# Patient Record
Sex: MALE | Race: BLACK OR AFRICAN AMERICAN | Employment: PART TIME | URBAN - METROPOLITAN AREA
[De-identification: names, ages, dates, MRNs, and addresses within clinical notes are randomized per-mention and may not be internally consistent; named-entity substitution may affect disease eponyms.]

---

## 2017-07-10 LAB
AVERAGE GLUCOSE: NORMAL
HBA1C MFR BLD: 6.1 %

## 2017-07-11 ENCOUNTER — OFFICE VISIT (OUTPATIENT)
Dept: PRIMARY CARE CLINIC | Age: 52
End: 2017-07-11

## 2017-07-11 VITALS
TEMPERATURE: 97.7 F | SYSTOLIC BLOOD PRESSURE: 120 MMHG | RESPIRATION RATE: 16 BRPM | WEIGHT: 224 LBS | HEART RATE: 69 BPM | OXYGEN SATURATION: 96 % | BODY MASS INDEX: 33.18 KG/M2 | DIASTOLIC BLOOD PRESSURE: 70 MMHG | HEIGHT: 69 IN

## 2017-07-11 DIAGNOSIS — R73.9 HYPERGLYCEMIA: ICD-10-CM

## 2017-07-11 DIAGNOSIS — N28.9 RENAL INSUFFICIENCY: ICD-10-CM

## 2017-07-11 DIAGNOSIS — Z01.818 PREOP EXAMINATION: Primary | ICD-10-CM

## 2017-07-11 PROCEDURE — 99214 OFFICE O/P EST MOD 30 MIN: CPT | Performed by: INTERNAL MEDICINE

## 2017-07-11 RX ORDER — CALCITRIOL 0.5 UG/1
0.5 CAPSULE, LIQUID FILLED ORAL DAILY
COMMUNITY
Start: 2017-05-16

## 2017-07-11 RX ORDER — MYCOPHENOLATE MOFETIL 250 MG/1
250 CAPSULE ORAL 2 TIMES DAILY
COMMUNITY
Start: 2017-06-23

## 2017-07-11 RX ORDER — FENOFIBRATE 200 MG/1
200 CAPSULE ORAL
COMMUNITY
End: 2017-07-11

## 2017-07-11 ASSESSMENT — ENCOUNTER SYMPTOMS
WHEEZING: 0
EYES NEGATIVE: 1
CHEST TIGHTNESS: 0

## 2017-07-11 ASSESSMENT — PATIENT HEALTH QUESTIONNAIRE - PHQ9
SUM OF ALL RESPONSES TO PHQ9 QUESTIONS 1 & 2: 0
SUM OF ALL RESPONSES TO PHQ QUESTIONS 1-9: 0
1. LITTLE INTEREST OR PLEASURE IN DOING THINGS: 0
2. FEELING DOWN, DEPRESSED OR HOPELESS: 0

## 2017-09-20 ENCOUNTER — OFFICE VISIT (OUTPATIENT)
Dept: PRIMARY CARE CLINIC | Age: 52
End: 2017-09-20

## 2017-09-20 VITALS
HEART RATE: 74 BPM | DIASTOLIC BLOOD PRESSURE: 70 MMHG | RESPIRATION RATE: 18 BRPM | BODY MASS INDEX: 33.5 KG/M2 | TEMPERATURE: 97 F | WEIGHT: 234 LBS | OXYGEN SATURATION: 100 % | SYSTOLIC BLOOD PRESSURE: 120 MMHG | HEIGHT: 70 IN

## 2017-09-20 DIAGNOSIS — I10 ESSENTIAL HYPERTENSION: ICD-10-CM

## 2017-09-20 DIAGNOSIS — E11.9 TYPE 2 DIABETES, DIET CONTROLLED (HCC): Primary | ICD-10-CM

## 2017-09-20 PROCEDURE — 99213 OFFICE O/P EST LOW 20 MIN: CPT | Performed by: INTERNAL MEDICINE

## 2017-09-20 RX ORDER — CLONIDINE HYDROCHLORIDE 0.2 MG/1
0.2 TABLET ORAL 2 TIMES DAILY
Qty: 60 TABLET | Refills: 6 | Status: SHIPPED | OUTPATIENT
Start: 2017-09-20 | End: 2019-06-06 | Stop reason: SDUPTHER

## 2017-09-20 RX ORDER — NIFEDIPINE 60 MG/1
60 TABLET, EXTENDED RELEASE ORAL 2 TIMES DAILY
Qty: 60 TABLET | Refills: 6 | Status: SHIPPED | OUTPATIENT
Start: 2017-09-20 | End: 2019-06-06 | Stop reason: SDUPTHER

## 2017-09-20 ASSESSMENT — ENCOUNTER SYMPTOMS
EYES NEGATIVE: 1
WHEEZING: 0
CHEST TIGHTNESS: 0

## 2018-01-23 LAB
AVERAGE GLUCOSE: NORMAL
HBA1C MFR BLD: 6.7 %

## 2018-06-08 ENCOUNTER — OFFICE VISIT (OUTPATIENT)
Dept: PRIMARY CARE CLINIC | Age: 53
End: 2018-06-08

## 2018-06-08 VITALS
HEART RATE: 61 BPM | SYSTOLIC BLOOD PRESSURE: 133 MMHG | WEIGHT: 228 LBS | TEMPERATURE: 97 F | RESPIRATION RATE: 16 BRPM | DIASTOLIC BLOOD PRESSURE: 78 MMHG | OXYGEN SATURATION: 98 % | HEIGHT: 70 IN | BODY MASS INDEX: 32.64 KG/M2

## 2018-06-08 DIAGNOSIS — M10.9 ACUTE GOUT OF RIGHT ANKLE, UNSPECIFIED CAUSE: Primary | ICD-10-CM

## 2018-06-08 PROCEDURE — 99213 OFFICE O/P EST LOW 20 MIN: CPT | Performed by: INTERNAL MEDICINE

## 2018-06-08 RX ORDER — COLCHICINE 0.6 MG/1
0.6 TABLET ORAL 2 TIMES DAILY
Qty: 60 TABLET | Refills: 3 | Status: SHIPPED | OUTPATIENT
Start: 2018-06-08 | End: 2019-01-23 | Stop reason: CLARIF

## 2018-06-08 RX ORDER — FEBUXOSTAT 80 MG/1
80 TABLET, FILM COATED ORAL DAILY
Qty: 30 TABLET | Refills: 3 | Status: SHIPPED | OUTPATIENT
Start: 2018-06-08

## 2018-06-08 ASSESSMENT — ENCOUNTER SYMPTOMS
EYE REDNESS: 0
RESPIRATORY NEGATIVE: 1
EYE DISCHARGE: 0
EYE ITCHING: 0
GASTROINTESTINAL NEGATIVE: 1
EYE PAIN: 0
PHOTOPHOBIA: 0

## 2018-06-13 ENCOUNTER — TELEPHONE (OUTPATIENT)
Dept: PRIMARY CARE CLINIC | Age: 53
End: 2018-06-13

## 2019-01-23 ENCOUNTER — TELEPHONE (OUTPATIENT)
Dept: PRIMARY CARE CLINIC | Age: 54
End: 2019-01-23

## 2019-01-23 ENCOUNTER — OFFICE VISIT (OUTPATIENT)
Dept: PRIMARY CARE CLINIC | Age: 54
End: 2019-01-23
Payer: COMMERCIAL

## 2019-01-23 VITALS
SYSTOLIC BLOOD PRESSURE: 100 MMHG | HEIGHT: 70 IN | BODY MASS INDEX: 33.36 KG/M2 | WEIGHT: 233 LBS | TEMPERATURE: 97.7 F | OXYGEN SATURATION: 97 % | HEART RATE: 60 BPM | DIASTOLIC BLOOD PRESSURE: 70 MMHG

## 2019-01-23 DIAGNOSIS — I10 ESSENTIAL HYPERTENSION: ICD-10-CM

## 2019-01-23 DIAGNOSIS — B35.4 TINEA CORPORIS: Primary | ICD-10-CM

## 2019-01-23 DIAGNOSIS — E11.9 TYPE 2 DIABETES, DIET CONTROLLED (HCC): ICD-10-CM

## 2019-01-23 PROCEDURE — 99213 OFFICE O/P EST LOW 20 MIN: CPT | Performed by: INTERNAL MEDICINE

## 2019-01-23 RX ORDER — DOCUSATE SODIUM 100 MG/1
100 CAPSULE, LIQUID FILLED ORAL 2 TIMES DAILY
COMMUNITY

## 2019-01-23 RX ORDER — SODIUM BICARBONATE 650 MG/1
650 TABLET ORAL 4 TIMES DAILY
COMMUNITY

## 2019-01-23 RX ORDER — TAMSULOSIN HYDROCHLORIDE 0.4 MG/1
0.4 CAPSULE ORAL DAILY
COMMUNITY
End: 2019-07-10 | Stop reason: SDUPTHER

## 2019-01-23 RX ORDER — CLOTRIMAZOLE AND BETAMETHASONE DIPROPIONATE 10; .64 MG/G; MG/G
CREAM TOPICAL
Qty: 4 G | Refills: 5 | Status: SHIPPED | OUTPATIENT
Start: 2019-01-23

## 2019-01-23 RX ORDER — CLOTRIMAZOLE AND BETAMETHASONE DIPROPIONATE 10; .64 MG/G; MG/G
CREAM TOPICAL
Qty: 2 G | Refills: 5 | Status: SHIPPED | OUTPATIENT
Start: 2019-01-23 | End: 2019-01-23 | Stop reason: CLARIF

## 2019-01-23 ASSESSMENT — PATIENT HEALTH QUESTIONNAIRE - PHQ9
SUM OF ALL RESPONSES TO PHQ QUESTIONS 1-9: 0
1. LITTLE INTEREST OR PLEASURE IN DOING THINGS: 0
SUM OF ALL RESPONSES TO PHQ QUESTIONS 1-9: 0
2. FEELING DOWN, DEPRESSED OR HOPELESS: 0
SUM OF ALL RESPONSES TO PHQ9 QUESTIONS 1 & 2: 0

## 2019-01-23 ASSESSMENT — ENCOUNTER SYMPTOMS
EYES NEGATIVE: 1
WHEEZING: 0
CHEST TIGHTNESS: 0

## 2019-05-24 ENCOUNTER — INITIAL CONSULT (OUTPATIENT)
Dept: SURGERY | Age: 54
End: 2019-05-24
Payer: COMMERCIAL

## 2019-05-24 VITALS
HEART RATE: 84 BPM | SYSTOLIC BLOOD PRESSURE: 121 MMHG | DIASTOLIC BLOOD PRESSURE: 70 MMHG | HEIGHT: 70 IN | WEIGHT: 225 LBS | BODY MASS INDEX: 32.21 KG/M2

## 2019-05-24 DIAGNOSIS — M79.604 PAIN IN BOTH LOWER EXTREMITIES: ICD-10-CM

## 2019-05-24 DIAGNOSIS — I70.219 TYPE 2 DIABETES MELLITUS WITH ATHEROSCLEROSIS OF NATIVE ARTERIES OF EXTREMITY WITH INTERMITTENT CLAUDICATION (HCC): ICD-10-CM

## 2019-05-24 DIAGNOSIS — I73.9 PAD (PERIPHERAL ARTERY DISEASE) (HCC): Primary | ICD-10-CM

## 2019-05-24 DIAGNOSIS — E11.51 TYPE 2 DIABETES MELLITUS WITH ATHEROSCLEROSIS OF NATIVE ARTERIES OF EXTREMITY WITH INTERMITTENT CLAUDICATION (HCC): ICD-10-CM

## 2019-05-24 DIAGNOSIS — M79.605 PAIN IN BOTH LOWER EXTREMITIES: ICD-10-CM

## 2019-05-24 PROCEDURE — 99213 OFFICE O/P EST LOW 20 MIN: CPT | Performed by: SURGERY

## 2019-05-24 ASSESSMENT — ENCOUNTER SYMPTOMS
RESPIRATORY NEGATIVE: 1
GASTROINTESTINAL NEGATIVE: 1
COLOR CHANGE: 0
ALLERGIC/IMMUNOLOGIC NEGATIVE: 1

## 2019-05-24 NOTE — LETTER
[facility]  [address] [place] [address]  Phone: [phone]  Fax: [phone]      May 24, 2019       Patient: Licha Bowen. MR Number: [id number]   YOB: 1965   Date of Visit: 5/24/2019       Dear Dr. Esthela Bello:    Thank you for the request for consultation for Pratibha Velasco to me. Below are the relevant portions of my assessment and plan of care. Assessment:       Diagnosis   1. PAD (peripheral artery disease) (HCC)    2. Pain in both lower extremities    3. Type 2 diabetes mellitus with atherosclerosis of native arteries of extremity with intermittent claudication (HCC)            Plan:       Since patient has long hx of DM2 with previous kidney transplant, and slightly low GILBERTO patient will return to office in 2wks for Arterial duplex of IVC, Iliacs, and B/L LE, then follow up every 6mo. Patient is to continue his daily 81MG Aspirin. Patient has been instructed to follow up in 1 month for a AAA scan and office visit. The patient will need to fast for at least 5 hours prior to the ultrasound scan. Please understand that by not doing so may result in having an inaccurate ultrasound and may cause your ultrasound to be rescheduled. Patient has been instructed to follow up in 1 month for an arterial duplex scan with gilberto's and office visit. Patient is encouraged to walk as much as tolerated. I Chastity Olea MA am scribing for and in the presence of Milo Bentley MD on this date of 05/24/19 at 2:49 PM    I Milo Bentley MD personally performed the services described in this documentation as scribed by the Certified Medical Assistant Chastity Olea in my presence and it is both accurate and complete. If you have questions, please do not hesitate to call me. I look forward to following [facility] along with you.     Sincerely,        Milo Bentley MD    CC providers:  No Recipients

## 2019-05-24 NOTE — PROGRESS NOTES
Subjective:      Patient ID: Steven Pelaez is a 47 y.o. male. Chief Complaint   Patient presents with    New Patient     New patient referred by Dr Kevin Hamilton for Abnormal PAD test.       Leg Pain    The incident occurred more than 1 week ago. The incident occurred at home. The injury mechanism is unknown. The pain is present in the left leg and right leg. The quality of the pain is described as cramping and aching. The pain is at a severity of 4/10. The pain is moderate. The pain has been intermittent since onset. Pertinent negatives include no loss of sensation, muscle weakness or numbness. It is unknown if a foreign body is present. The symptoms are aggravated by weight bearing. He has tried rest and elevation for the symptoms. The treatment provided mild relief. Family History   Problem Relation Age of Onset    Diabetes Mother     Diabetes Father     Diabetes Sister     Diabetes Brother        Past Medical History:   Diagnosis Date    Hypertension     Kidney disease     Type II or unspecified type diabetes mellitus without mention of complication, not stated as uncontrolled      History reviewed. No pertinent surgical history.     Social History     Socioeconomic History    Marital status: Single     Spouse name: Not on file    Number of children: Not on file    Years of education: Not on file    Highest education level: Not on file   Occupational History    Not on file   Social Needs    Financial resource strain: Not on file    Food insecurity:     Worry: Not on file     Inability: Not on file    Transportation needs:     Medical: Not on file     Non-medical: Not on file   Tobacco Use    Smoking status: Never Smoker    Smokeless tobacco: Never Used   Substance and Sexual Activity    Alcohol use: No    Drug use: No    Sexual activity: Yes     Partners: Female     Birth control/protection: Condom   Lifestyle    Physical activity:     Days per week: Not on file     Minutes per session: Not on file    Stress: Not on file   Relationships    Social connections:     Talks on phone: Not on file     Gets together: Not on file     Attends [employer] service: Not on file     Active member of club or organization: Not on file     Attends meetings of clubs or organizations: Not on file     Relationship status: Not on file    Intimate partner violence:     Fear of current or ex partner: Not on file     Emotionally abused: Not on file     Physically abused: Not on file     Forced sexual activity: Not on file   Other Topics Concern    Not on file   Social History Narrative    Not on file         Review of Systems   Constitutional: Negative. Negative for activity change and appetite change. HENT: Negative. Eyes: Positive for visual disturbance (wears glasses ). Respiratory: Negative. Cardiovascular: Negative. Gastrointestinal: Negative. Endocrine: Negative. Genitourinary: Negative. Musculoskeletal: Negative. Negative for gait problem, joint swelling and myalgias. Skin: Negative for color change, pallor and wound. Allergic/Immunologic: Negative. Neurological: Negative. Negative for numbness. Hematological: Negative. Psychiatric/Behavioral: Negative. Vitals:    05/24/19 1423   BP: 121/70   Pulse: 84       Objective:   Physical Exam   Constitutional: He is oriented to person, place, and time. He appears well-developed and well-nourished. No distress. HENT:   Head: Normocephalic and atraumatic. Nose: Nose normal.   Eyes: Pupils are equal, round, and reactive to light. Conjunctivae and EOM are normal. Right eye exhibits no discharge. Left eye exhibits no discharge. No scleral icterus. Neck: Normal range of motion. No thyromegaly present. Cardiovascular: Normal rate, regular rhythm, normal heart sounds and intact distal pulses. DP and PT 1/4 and present B/L LE. Pulmonary/Chest: Effort normal and breath sounds normal.   Abdominal: Soft.    Musculoskeletal: Normal range of motion. He exhibits no edema, tenderness or deformity. Neurological: He is alert and oriented to person, place, and time. Skin: Skin is dry. Capillary refill takes less than 2 seconds. No rash noted. He is not diaphoretic. No erythema. No pallor. Psychiatric: He has a normal mood and affect. His behavior is normal. Judgment and thought content normal.   Nursing note and vitals reviewed. No Known Allergies    Assessment:       Diagnosis Orders   1. PAD (peripheral artery disease) (HCC)  VL DUP LOWER EXTREMITY ARTERIES BILATERAL    VL AORTA/IVC/ILIAC/BYPASS GRAFT COMPLETE   2. Pain in both lower extremities  VL DUP LOWER EXTREMITY ARTERIES BILATERAL    VL AORTA/IVC/ILIAC/BYPASS GRAFT COMPLETE   3. 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 Patient is to continue his daily 81MG Aspirin. Patient has been instructed to follow up in 1 month for a AAA scan and office visit. The patient will need to fast for at least 5 hours prior to the ultrasound scan. Please understand that by not doing so may result in having an inaccurate ultrasound and may cause your ultrasound to be rescheduled. Patient has been instructed to follow up in 1 month for an arterial duplex scan with gilberto's and office visit. Patient is encouraged to walk as much as tolerated.     I Abbi Chávez MA am scribing for and in the presence of Vasiliy Mckeon MD on this date of 05/24/19 at 2:49 PM    I Vasiliy Mckeon MD personally performed the services described in this documentation as scribed by the Certified [facility] Fernandez in my presence and it is both accurate and complete.     Federico Pritchard DPM   Podiatric Resident, PGY-2  Pager: [phone]  5/24/2019, 2:49 PM

## 2019-06-06 ENCOUNTER — OFFICE VISIT (OUTPATIENT)
Dept: PRIMARY CARE CLINIC | Age: 54
End: 2019-06-06
Payer: COMMERCIAL

## 2019-06-06 VITALS
BODY MASS INDEX: 33.79 KG/M2 | WEIGHT: 236 LBS | DIASTOLIC BLOOD PRESSURE: 75 MMHG | HEART RATE: 68 BPM | HEIGHT: 70 IN | SYSTOLIC BLOOD PRESSURE: 135 MMHG

## 2019-06-06 DIAGNOSIS — D64.9 ANEMIA, UNSPECIFIED TYPE: ICD-10-CM

## 2019-06-06 DIAGNOSIS — I10 ESSENTIAL HYPERTENSION: ICD-10-CM

## 2019-06-06 DIAGNOSIS — E11.9 TYPE 2 DIABETES MELLITUS WITHOUT COMPLICATION, WITHOUT LONG-TERM CURRENT USE OF INSULIN (HCC): ICD-10-CM

## 2019-06-06 PROCEDURE — 99214 OFFICE O/P EST MOD 30 MIN: CPT | Performed by: INTERNAL MEDICINE

## 2019-06-06 RX ORDER — CLONIDINE HYDROCHLORIDE 0.2 MG/1
0.2 TABLET ORAL 2 TIMES DAILY
Qty: 60 TABLET | Refills: 6 | Status: SHIPPED | OUTPATIENT
Start: 2019-06-06 | End: 2019-10-01 | Stop reason: SDUPTHER

## 2019-06-06 RX ORDER — GLIMEPIRIDE 1 MG/1
1 TABLET ORAL EVERY MORNING
Qty: 30 TABLET | Refills: 5 | Status: SHIPPED | OUTPATIENT
Start: 2019-06-06 | End: 2019-07-10 | Stop reason: SDUPTHER

## 2019-06-06 RX ORDER — NIFEDIPINE 60 MG/1
60 TABLET, EXTENDED RELEASE ORAL 2 TIMES DAILY
Qty: 60 TABLET | Refills: 6 | Status: SHIPPED | OUTPATIENT
Start: 2019-06-06 | End: 2019-10-01 | Stop reason: SDUPTHER

## 2019-06-06 ASSESSMENT — ENCOUNTER SYMPTOMS
GASTROINTESTINAL NEGATIVE: 1
EYES NEGATIVE: 1
SHORTNESS OF BREATH: 0
WHEEZING: 0
CHEST TIGHTNESS: 0
COUGH: 0

## 2019-06-06 NOTE — PROGRESS NOTES
Subjective:      Patient ID: Danielle Kelly is a 47 y.o. male. 6/6/19 Patient presents with: Follow-up: discuss labs            Last seen  1/23/19 Patient presents with:  Rash: c/o chafing raw skin under stomach for 1 week. Last seen  9/20/`17 Patient presents with:  Blood Sugar Problem  Hypertension              Last seen  7/11/17 Patient presents with:  Pre-op Exam: Surgery scheduled 7/17/17 Dr Sabine Cheung Cystoscopy  H/O repeat UTIs           Last seen 7/26/16     S/p kidney transplant 2014 ; transplant not going too well   No personal or FH of anaesthesia problems or bleeding disorders        Rash   Pertinent negatives include no cough, fatigue, fever or shortness of breath. Review of Systems   Constitutional: Negative for chills, fatigue and fever. Td < 10 yrs . Hep B vaccination complete    Flu vac 2013; [facility]    HENT: Negative. Dental exam 8/12   Eyes: Negative. Eye Ex  11/18   Respiratory: Negative for cough, chest tightness, shortness of breath and wheezing. Does not smoke . Etoh occ   Cardiovascular: Negative. No CAD ; EST at [facility] 12/13 . Nl    Gastrointestinal: Negative. No FH of ca colon . colonoscopy 12/13 ; Dr Nani Hankins    Genitourinary: Negative for dysuria, frequency and urgency. S/P Renal Transplant  12/14 . Waiting for new Kidney             Musculoskeletal: Negative. Skin: Negative for rash. Neurological: Negative for dizziness, tremors, weakness, light-headedness and headaches. Psychiatric/Behavioral: Negative for behavioral problems and sleep disturbance. The patient is not nervous/anxious. Objective:   Physical Exam   Constitutional: He is oriented to person, place, and time. He appears well-nourished. Eyes: Conjunctivae are normal.   Neck: Neck supple. Cardiovascular: Normal rate, regular rhythm and normal heart sounds. Exam reveals no friction rub.    Pulmonary/Chest: Breath sounds normal. Abdominal: He exhibits distension. Genitourinary: Rectum normal.   Musculoskeletal: Normal range of motion. He exhibits no edema. Neurological: He is alert and oriented to person, place, and time. He has normal strength. Skin: Skin is warm. Psychiatric: He has a normal mood and affect. His behavior is normal.   Vitals reviewed. Assessment:      Kimberly Barger was seen today for follow-up. Diagnoses and all orders for this visit:    Essential hypertension Controlled   -     NIFEdipine (NIFEDICAL XL) 60 MG extended release tablet; Take 1 tablet by mouth 2 times daily  -     cloNIDine (CATAPRES) 0.2 MG tablet; Take 1 tablet by mouth 2 times daily    Type 2 diabetes mellitus without complication, without long-term current use of insulin ([facility]) A1C 7.9 . Was on Diet control nly . Start Amaryl 1 mg / d   -     glimepiride (AMARYL) 1 MG tablet; Take 1 tablet by mouth every morning    Anemia, unspecified type  Renal . Needs Colonoscopy too   -     DOMINGUEZ - Mari Mosher MD, Gastroenterology, [facility]        Renal insufficiency   Progressive . on Cellcept .  Being evaluated for another transplant C/O Dr Lizette Gardner

## 2019-07-05 ENCOUNTER — PROCEDURE VISIT (OUTPATIENT)
Dept: SURGERY | Age: 54
End: 2019-07-05
Payer: COMMERCIAL

## 2019-07-05 DIAGNOSIS — I70.219 TYPE 2 DIABETES MELLITUS WITH ATHEROSCLEROSIS OF NATIVE ARTERIES OF EXTREMITY WITH INTERMITTENT CLAUDICATION (HCC): ICD-10-CM

## 2019-07-05 DIAGNOSIS — E11.51 TYPE 2 DIABETES MELLITUS WITH ATHEROSCLEROSIS OF NATIVE ARTERIES OF EXTREMITY WITH INTERMITTENT CLAUDICATION (HCC): ICD-10-CM

## 2019-07-05 DIAGNOSIS — M79.604 PAIN IN BOTH LOWER EXTREMITIES: ICD-10-CM

## 2019-07-05 DIAGNOSIS — I73.9 PAD (PERIPHERAL ARTERY DISEASE) (HCC): ICD-10-CM

## 2019-07-05 DIAGNOSIS — M79.605 PAIN IN BOTH LOWER EXTREMITIES: ICD-10-CM

## 2019-07-05 PROCEDURE — 93978 VASCULAR STUDY: CPT | Performed by: SURGERY

## 2019-07-05 PROCEDURE — 93925 LOWER EXTREMITY STUDY: CPT | Performed by: SURGERY

## 2019-07-09 ENCOUNTER — PATIENT MESSAGE (OUTPATIENT)
Dept: PRIMARY CARE CLINIC | Age: 54
End: 2019-07-09

## 2019-07-09 DIAGNOSIS — E11.9 TYPE 2 DIABETES MELLITUS WITHOUT COMPLICATION, WITHOUT LONG-TERM CURRENT USE OF INSULIN (HCC): ICD-10-CM

## 2019-07-10 RX ORDER — TAMSULOSIN HYDROCHLORIDE 0.4 MG/1
0.4 CAPSULE ORAL DAILY
Qty: 90 CAPSULE | Refills: 1 | Status: SHIPPED | OUTPATIENT
Start: 2019-07-10

## 2019-07-10 RX ORDER — GLIMEPIRIDE 1 MG/1
1 TABLET ORAL EVERY MORNING
Qty: 90 TABLET | Refills: 1 | Status: SHIPPED | OUTPATIENT
Start: 2019-07-10 | End: 2020-06-09 | Stop reason: SDUPTHER

## 2019-07-10 NOTE — TELEPHONE ENCOUNTER
From: Cuca Marvin. To: Amy Machuca MD  Sent: 7/9/2019 4:51 PM EDT  Subject: Prescription Question    Can you send a refill to [facility] for Glimepiride an Tamsulosin only.

## 2019-10-01 ENCOUNTER — OFFICE VISIT (OUTPATIENT)
Dept: PRIMARY CARE CLINIC | Age: 54
End: 2019-10-01
Payer: COMMERCIAL

## 2019-10-01 VITALS
SYSTOLIC BLOOD PRESSURE: 120 MMHG | BODY MASS INDEX: 33.21 KG/M2 | HEART RATE: 75 BPM | WEIGHT: 232 LBS | HEIGHT: 70 IN | DIASTOLIC BLOOD PRESSURE: 60 MMHG

## 2019-10-01 DIAGNOSIS — I10 ESSENTIAL HYPERTENSION: ICD-10-CM

## 2019-10-01 DIAGNOSIS — H44.001 INFECTION OF RIGHT EYE: ICD-10-CM

## 2019-10-01 DIAGNOSIS — E11.9 TYPE 2 DIABETES MELLITUS WITHOUT COMPLICATION, WITHOUT LONG-TERM CURRENT USE OF INSULIN (HCC): ICD-10-CM

## 2019-10-01 DIAGNOSIS — Z23 NEEDS FLU SHOT: ICD-10-CM

## 2019-10-01 LAB — HBA1C MFR BLD: 7.3 %

## 2019-10-01 PROCEDURE — 90471 IMMUNIZATION ADMIN: CPT | Performed by: INTERNAL MEDICINE

## 2019-10-01 PROCEDURE — 90686 IIV4 VACC NO PRSV 0.5 ML IM: CPT | Performed by: INTERNAL MEDICINE

## 2019-10-01 PROCEDURE — 83036 HEMOGLOBIN GLYCOSYLATED A1C: CPT | Performed by: INTERNAL MEDICINE

## 2019-10-01 PROCEDURE — 99214 OFFICE O/P EST MOD 30 MIN: CPT | Performed by: INTERNAL MEDICINE

## 2019-10-01 RX ORDER — SULFACETAMIDE SODIUM 100 MG/ML
2 SOLUTION/ DROPS OPHTHALMIC 4 TIMES DAILY
Qty: 1 BOTTLE | Refills: 0 | Status: SHIPPED | OUTPATIENT
Start: 2019-10-01 | End: 2019-10-11

## 2019-10-01 RX ORDER — CLONIDINE HYDROCHLORIDE 0.2 MG/1
0.2 TABLET ORAL 2 TIMES DAILY
Qty: 60 TABLET | Refills: 6 | Status: SHIPPED | OUTPATIENT
Start: 2019-10-01 | End: 2021-01-27 | Stop reason: SDUPTHER

## 2019-10-01 RX ORDER — NIFEDIPINE 60 MG/1
60 TABLET, EXTENDED RELEASE ORAL 2 TIMES DAILY
Qty: 60 TABLET | Refills: 6 | Status: SHIPPED | OUTPATIENT
Start: 2019-10-01 | End: 2020-06-09 | Stop reason: SDUPTHER

## 2019-10-01 ASSESSMENT — ENCOUNTER SYMPTOMS
WHEEZING: 0
CHEST TIGHTNESS: 0
GASTROINTESTINAL NEGATIVE: 1
EYES NEGATIVE: 1
SHORTNESS OF BREATH: 0
COUGH: 0

## 2020-01-08 ENCOUNTER — OFFICE VISIT (OUTPATIENT)
Dept: PRIMARY CARE CLINIC | Age: 55
End: 2020-01-08
Payer: COMMERCIAL

## 2020-01-08 VITALS
SYSTOLIC BLOOD PRESSURE: 138 MMHG | WEIGHT: 227 LBS | HEIGHT: 70 IN | BODY MASS INDEX: 32.5 KG/M2 | HEART RATE: 67 BPM | DIASTOLIC BLOOD PRESSURE: 78 MMHG

## 2020-01-08 PROCEDURE — 99213 OFFICE O/P EST LOW 20 MIN: CPT | Performed by: INTERNAL MEDICINE

## 2020-01-08 RX ORDER — HYDROCODONE BITARTRATE AND ACETAMINOPHEN 5; 325 MG/1; MG/1
1 TABLET ORAL EVERY 6 HOURS PRN
COMMUNITY
End: 2021-01-27

## 2020-01-08 RX ORDER — METHOCARBAMOL 750 MG/1
750 TABLET, FILM COATED ORAL 4 TIMES DAILY
COMMUNITY
End: 2022-04-11

## 2020-01-08 ASSESSMENT — PATIENT HEALTH QUESTIONNAIRE - PHQ9
SUM OF ALL RESPONSES TO PHQ QUESTIONS 1-9: 0
SUM OF ALL RESPONSES TO PHQ9 QUESTIONS 1 & 2: 0
1. LITTLE INTEREST OR PLEASURE IN DOING THINGS: 0
2. FEELING DOWN, DEPRESSED OR HOPELESS: 0
SUM OF ALL RESPONSES TO PHQ QUESTIONS 1-9: 0

## 2020-01-08 ASSESSMENT — ENCOUNTER SYMPTOMS
EYES NEGATIVE: 1
GASTROINTESTINAL NEGATIVE: 1
COUGH: 0
WHEEZING: 0
SHORTNESS OF BREATH: 0
CHEST TIGHTNESS: 0

## 2020-01-08 NOTE — PROGRESS NOTES
Subjective:      Patient ID: Bimal Argueta. is a 49 y.o. male. 1/8/2020 Patient presents with:  Jaun Mills    Was seen in ER 12/28/19 with rt sided flank pain . ? Hernia . Pain continues! Last seen  10/1/19          Surgery    7/17/17 Dr Britt Esquivel Cystoscopy  H/O repeat UTIs         S/p kidney transplant 2014             Review of Systems   Constitutional: Negative for chills, fatigue and fever. Td < 10 yrs . Hep B vaccination complete      Flu vac 10/19   HENT: Negative. Dental exam 8/12   Eyes: Negative. Eye Ex  11/18   Respiratory: Negative for cough, chest tightness, shortness of breath and wheezing. Does not smoke . Etoh occ   Cardiovascular: Negative. No CAD ; EST at [facility] 12/13 . Nl    Gastrointestinal: Negative. No FH of ca colon . colonoscopy 12/13 ; Dr Andreia Gan    Genitourinary: Negative for dysuria, frequency and urgency. S/P Renal Transplant  12/14 . Waiting for new Kidney             Musculoskeletal: Negative. Skin: Negative for rash. Neurological: Negative for dizziness, tremors, weakness, light-headedness and headaches. Psychiatric/Behavioral: Negative for behavioral problems and sleep disturbance. The patient is not nervous/anxious. Objective:   Physical Exam   Constitutional: He is oriented to person, place, and time. He appears well-nourished. Eyes: Conjunctivae are normal.   Inflammation lower eye lid / Rt ; pebble like lesions   Neck: Neck supple. Cardiovascular: Normal rate, regular rhythm and normal heart sounds. Exam reveals no friction rub. Pulmonary/Chest: Breath sounds normal.   Abdominal: He exhibits distension. There is tenderness (rt ). Genitourinary:    Rectum normal.     Musculoskeletal: Normal range of motion. Neurological: He is alert and oriented to person, place, and time. He has normal strength. Skin: Skin is warm. Psychiatric: He has a normal mood and affect.  His behavior is

## 2020-01-09 ENCOUNTER — INITIAL CONSULT (OUTPATIENT)
Dept: SURGERY | Age: 55
End: 2020-01-09
Payer: COMMERCIAL

## 2020-01-09 VITALS
HEART RATE: 79 BPM | WEIGHT: 223 LBS | BODY MASS INDEX: 32 KG/M2 | SYSTOLIC BLOOD PRESSURE: 151 MMHG | DIASTOLIC BLOOD PRESSURE: 80 MMHG

## 2020-01-09 PROCEDURE — 99243 OFF/OP CNSLTJ NEW/EST LOW 30: CPT | Performed by: SURGERY

## 2020-01-09 NOTE — Clinical Note
Kyle Flores just saw Mr. Humberto Dias in the office for his right hip pain. I think his pain is musculoskeletal in origin, but he does have a right inguinal hernia that is reducible. As he is a transplant patient with an inguinal hernia near his renal transplant site, I referred him back to Dr. Riki Juares at [facility] for further evaluation and treatment. Thank you for allowing me to take care of Mr. Joaquina Fox with you. Angie Ch

## 2020-01-13 NOTE — PROGRESS NOTES
New Patient [facility]   Claudy Jose MD    [facility] [address]  Rosalind Velazquez  Phone: [phone]  Fax: [phone]    Josi Emil. YOB: 1965    Date of Visit:  1/9/2020    Eliud Roberts MD    HPI:   Inguinal Hernia: Josi Stein. is 49 y.o. male seen at request of Sameer Sanderson MD.  He presents for evaluation of right hip pain. Symptoms were first noted 1 month ago. Pain is intermittent, worse with certain movements. He denies any right groin pain or lump. He has no symptoms of chronic constipation, chronic cough, difficulty urinating. He has previous history of renal transplant in the RLQ surgery. He is currently on immunosuppression. Pain Score:   8    No Known Allergies  Outpatient Medications Marked as Taking for the 1/9/20 encounter (Initial consult) with Ольга Arredondo MD   Medication Sig Dispense Refill    HYDROcodone-acetaminophen (NORCO) 5-325 MG per tablet Take 1 tablet by mouth every 6 hours as needed for Pain.  methocarbamol (ROBAXIN) 750 MG tablet Take 750 mg by mouth 4 times daily      NIFEdipine (NIFEDICAL XL) 60 MG extended release tablet Take 1 tablet by mouth 2 times daily 60 tablet 6    glimepiride (AMARYL) 1 MG tablet Take 1 tablet by mouth every morning 90 tablet 1    tamsulosin (FLOMAX) 0.4 MG capsule Take 1 capsule by mouth daily 90 capsule 1    vitamin D (CHOLECALCIFEROL) 1000 UNIT TABS tablet Take 1,000 Units by mouth daily      sodium bicarbonate 650 MG tablet Take 650 mg by mouth 4 times daily      aspirin 81 MG tablet Take 81 mg by mouth daily      docusate sodium (COLACE) 100 MG capsule Take 100 mg by mouth 2 times daily      belatacept (NULOJIX) 250 MG SOLR Infuse intravenously      clotrimazole-betamethasone (LOTRISONE) 1-0.05 % cream Apply topically 2 times daily.  4 g 5    mycophenolate (CELLCEPT) 250 MG capsule Take 250 mg by mouth 2 times daily      calcitRIOL (ROCALTROL) 0.5 MCG capsule Take 0.5 mcg by mouth daily         Past Medical History:   Diagnosis Date    Hypertension     Kidney disease     Type II or unspecified type diabetes mellitus without mention of complication, not stated as uncontrolled      History reviewed. No pertinent surgical history.   Family History   Problem Relation Age of Onset    Diabetes Mother     Diabetes Father     Diabetes Sister     Diabetes Brother      Social History     Socioeconomic History    Marital status: Single     Spouse name: Not on file    Number of children: Not on file    Years of education: Not on file    Highest education level: Not on file   Occupational History    Not on file   Social Needs    Financial resource strain: Not on file    Food insecurity:     Worry: Not on file     Inability: Not on file    Transportation needs:     Medical: Not on file     Non-medical: Not on file   Tobacco Use    Smoking status: Never Smoker    Smokeless tobacco: Never Used   Substance and Sexual Activity    Alcohol use: No    Drug use: No    Sexual activity: Yes     Partners: Female     Birth control/protection: Condom   Lifestyle    Physical activity:     Days per week: Not on file     Minutes per session: Not on file    Stress: Not on file   Relationships    Social connections:     Talks on phone: Not on file     Gets together: Not on file     Attends [employer] service: Not on file     Active member of club or organization: Not on file     Attends meetings of clubs or organizations: Not on file     Relationship status: Not on file    Intimate partner violence:     Fear of current or ex partner: Not on file     Emotionally abused: Not on file     Physically abused: Not on file     Forced sexual activity: Not on file   Other Topics Concern    Not on file   Social History Narrative    Not on file          Vitals:    01/09/20 1359   BP: (!) 151/80   Pulse: 79   Weight: 223 lb (101.2 kg)     Body mass index is 32 kg/m². Wt Readings from Last 3 Encounters:   01/09/20 223 lb (101.2 kg)   01/08/20 227 lb (103 kg)   10/01/19 232 lb (105.2 kg)     BP Readings from Last 3 Encounters:   01/09/20 (!) 151/80   01/08/20 138/78   10/01/19 120/60          REVIEW OF SYSTEMS:   Pertinent positives are in HPI, otherwise all systems reviewed and negative    PHYSICAL EXAM:    CONSTITUTIONAL:  awake, alert, no apparent distress and mildly obese  ENT:  normocephalic, without obvious abnormality  NECK:  supple, symmetrical, trachea midline   LUNGS:  Resp easy and unlabored  CARDIOVASCULAR:  regular rate and rhythm  ABDOMEN:  Right lower quadrant oblique scar well healed, tenderness of the right hip without bulge surrounding it, soft, non-distended, voluntary guarding absent,  Examination for hernias: right inguinal hernia present, reducible. MUSCULOSKELETAL: No edema  NEUROLOGIC:  Mental Status Exam:  Level of Alertness:   awake  Orientation:   person, place, time      DATA:  CT abd[facility]pelvis [facility] 12/28/19 at [facility]:  IMPRESSION:    1.  Increased wall thickening associated with the herniated wall the bladder into the right inguinal hernia raising the possibility of strangulation. Neoplastic wall thickening is also not excluded. 2. Brent Wellington atrophic native kidneys and right lower quadrant transplant kidney. No hydronephrosis or perinephric collection. 3.  Nodular opacities in the left lower lobe favored to be inflammatory. Follow up chest CT in 3-6 months suggested to ensure resolution. Signed ByAddi Panchal MD, LUCIE    ASSESSMENT:     Diagnosis Orders   1. Reducible right inguinal hernia  External Referral To General Surgery   2. History of renal transplant  External Referral To General Surgery         PLAN:    Mr. Chantelle Brink has right hip pain along with a bladder containing right inguinal hernia on the side of his previous renal transplant.   I think his hip pain is musculoskeletal in

## 2020-02-18 LAB
AVERAGE GLUCOSE: NORMAL
HBA1C MFR BLD: 8.7 %

## 2020-04-05 ENCOUNTER — PATIENT MESSAGE (OUTPATIENT)
Dept: PRIMARY CARE CLINIC | Age: 55
End: 2020-04-05

## 2020-04-06 ENCOUNTER — TELEPHONE (OUTPATIENT)
Dept: PRIMARY CARE CLINIC | Age: 55
End: 2020-04-06

## 2020-04-06 DIAGNOSIS — E11.9 TYPE 2 DIABETES MELLITUS WITHOUT COMPLICATION, WITHOUT LONG-TERM CURRENT USE OF INSULIN (HCC): Primary | ICD-10-CM

## 2020-04-06 NOTE — TELEPHONE ENCOUNTER
From: Jose Miguel Cordova. To: Adrianna Jamil MD  Sent: 4/5/2020 8:08 AM EDT  Subject: Prescription Question    I need a prescription for a new glucose meter. Send to [facility] on [address]. I have voucher it.

## 2020-04-07 RX ORDER — BLOOD-GLUCOSE METER
1 EACH MISCELLANEOUS DAILY
Qty: 1 KIT | Refills: 0 | Status: SHIPPED | OUTPATIENT
Start: 2020-04-07 | End: 2020-06-15 | Stop reason: SDUPTHER

## 2020-06-10 RX ORDER — GLIMEPIRIDE 1 MG/1
1 TABLET ORAL EVERY MORNING
Qty: 90 TABLET | Refills: 1 | Status: SHIPPED | OUTPATIENT
Start: 2020-06-10 | End: 2020-06-15 | Stop reason: SDUPTHER

## 2020-06-10 RX ORDER — NIFEDIPINE 60 MG/1
60 TABLET, EXTENDED RELEASE ORAL 2 TIMES DAILY
Qty: 60 TABLET | Refills: 6 | Status: SHIPPED | OUTPATIENT
Start: 2020-06-10 | End: 2020-06-15 | Stop reason: SDUPTHER

## 2020-06-15 RX ORDER — GLIMEPIRIDE 1 MG/1
1 TABLET ORAL EVERY MORNING
Qty: 90 TABLET | Refills: 1 | Status: SHIPPED | OUTPATIENT
Start: 2020-06-15 | End: 2021-01-27 | Stop reason: SDUPTHER

## 2020-06-15 RX ORDER — NIFEDIPINE 60 MG/1
60 TABLET, EXTENDED RELEASE ORAL 2 TIMES DAILY
Qty: 60 TABLET | Refills: 6 | Status: SHIPPED | OUTPATIENT
Start: 2020-06-15 | End: 2021-01-27 | Stop reason: SDUPTHER

## 2020-06-15 RX ORDER — BLOOD-GLUCOSE METER
1 EACH MISCELLANEOUS DAILY
Qty: 1 KIT | Refills: 0 | Status: SHIPPED | OUTPATIENT
Start: 2020-06-15 | End: 2021-07-29

## 2020-07-17 RX ORDER — BLOOD SUGAR DIAGNOSTIC
1 STRIP MISCELLANEOUS DAILY
Qty: 100 EACH | Refills: 3 | Status: SHIPPED | OUTPATIENT
Start: 2020-07-17 | End: 2021-07-29

## 2021-01-11 LAB
AVERAGE GLUCOSE: NORMAL
HBA1C MFR BLD: 6.3 %

## 2021-01-27 ENCOUNTER — OFFICE VISIT (OUTPATIENT)
Dept: PRIMARY CARE CLINIC | Age: 56
End: 2021-01-27
Payer: COMMERCIAL

## 2021-01-27 VITALS
DIASTOLIC BLOOD PRESSURE: 80 MMHG | WEIGHT: 218.4 LBS | HEIGHT: 70 IN | SYSTOLIC BLOOD PRESSURE: 130 MMHG | BODY MASS INDEX: 31.26 KG/M2 | TEMPERATURE: 97.5 F | HEART RATE: 68 BPM | OXYGEN SATURATION: 98 %

## 2021-01-27 DIAGNOSIS — Z11.59 ENCOUNTER FOR HEPATITIS C VIRUS SCREENING TEST FOR HIGH RISK PATIENT: ICD-10-CM

## 2021-01-27 DIAGNOSIS — Z94.0 KIDNEY TRANSPLANTED: ICD-10-CM

## 2021-01-27 DIAGNOSIS — E11.9 TYPE 2 DIABETES MELLITUS WITHOUT COMPLICATION, WITHOUT LONG-TERM CURRENT USE OF INSULIN (HCC): ICD-10-CM

## 2021-01-27 DIAGNOSIS — Z23 HIGH PRIORITY FOR COVID-19 VIRUS VACCINATION: ICD-10-CM

## 2021-01-27 DIAGNOSIS — I10 ESSENTIAL HYPERTENSION: ICD-10-CM

## 2021-01-27 DIAGNOSIS — Z91.89 ENCOUNTER FOR HEPATITIS C VIRUS SCREENING TEST FOR HIGH RISK PATIENT: ICD-10-CM

## 2021-01-27 PROCEDURE — 99214 OFFICE O/P EST MOD 30 MIN: CPT | Performed by: INTERNAL MEDICINE

## 2021-01-27 RX ORDER — CLONIDINE HYDROCHLORIDE 0.2 MG/1
0.2 TABLET ORAL 2 TIMES DAILY
Qty: 60 TABLET | Refills: 6 | Status: SHIPPED | OUTPATIENT
Start: 2021-01-27 | End: 2021-01-28 | Stop reason: DRUGHIGH

## 2021-01-27 RX ORDER — GLIMEPIRIDE 1 MG/1
1 TABLET ORAL EVERY MORNING
Qty: 90 TABLET | Refills: 1 | Status: SHIPPED | OUTPATIENT
Start: 2021-01-27 | End: 2021-06-07

## 2021-01-27 RX ORDER — NIFEDIPINE 60 MG/1
60 TABLET, EXTENDED RELEASE ORAL 2 TIMES DAILY
Qty: 60 TABLET | Refills: 6 | Status: SHIPPED | OUTPATIENT
Start: 2021-01-27 | End: 2021-01-28 | Stop reason: DRUGHIGH

## 2021-01-27 ASSESSMENT — ENCOUNTER SYMPTOMS
SHORTNESS OF BREATH: 0
COUGH: 0
EYES NEGATIVE: 1
GASTROINTESTINAL NEGATIVE: 1
WHEEZING: 0
CHEST TIGHTNESS: 0

## 2021-01-27 ASSESSMENT — PATIENT HEALTH QUESTIONNAIRE - PHQ9
SUM OF ALL RESPONSES TO PHQ QUESTIONS 1-9: 0
SUM OF ALL RESPONSES TO PHQ9 QUESTIONS 1 & 2: 0
1. LITTLE INTEREST OR PLEASURE IN DOING THINGS: 0
SUM OF ALL RESPONSES TO PHQ QUESTIONS 1-9: 0

## 2021-01-27 NOTE — PROGRESS NOTES
Subjective:      Patient ID: Seema Le. is a 48 y.o. male. 1/27/2021 Patient presents with:  Diabetes: a1c- 1/11/2021-6.3 taken at the [facility]  Hypertension              Last seen  1/8/2020 Patient presents with:  Anila Bueno    Was seen in ER 12/28/19 with rt sided flank pain . ? Hernia . Pain continues! Last seen  10/1/19          Surgery    7/17/17 Dr Correia Mercury Cystoscopy  H/O repeat UTIs         S/p kidney transplant 2014           Diabetes  Pertinent negatives for hypoglycemia include no dizziness, headaches, nervousness/anxiousness or tremors. Pertinent negatives for diabetes include no fatigue and no weakness. Hypertension  Pertinent negatives include no headaches or shortness of breath. Review of Systems   Constitutional: Negative for chills, fatigue and fever. Tdap 2013    Hep A & B vaccination complete      Pneumonia vac 2017 ; 2018    Flu vac 9/20 at [place] [address] 12/20    Shingrex 2019   HENT: Negative. Dental exam 8/12   Eyes: Negative. Eye Ex  12/20   Respiratory: Negative for cough, chest tightness, shortness of breath and wheezing. Does not smoke . Etoh occ   Cardiovascular: Negative. No CAD ; EST at [facility] 12/13 . Nl    Gastrointestinal: Negative. No FH of ca colon . colonoscopy 12/13 ; Dr Massimo Gage    Genitourinary: Negative for dysuria, frequency and urgency. S/P Renal Transplant  12/14 . Waiting for new Kidney             Musculoskeletal: Negative. Skin: Negative for rash. Neurological: Negative for dizziness, tremors, weakness, light-headedness and headaches. Psychiatric/Behavioral: Negative for behavioral problems and sleep disturbance. The patient is not nervous/anxious. Objective:   Physical Exam   Constitutional: He is oriented to person, place, and time. No distress. Eyes:   I   Neck: Neck supple. Cardiovascular: Normal rate, regular rhythm and normal heart sounds. Exam reveals no friction rub. Pulmonary/Chest: Breath sounds normal.   Abdominal: He exhibits distension. Musculoskeletal: Normal range of motion. Neurological: He is alert and oriented to person, place, and time. He has normal strength. Skin: Skin is warm. Psychiatric: He has a normal mood and affect. His behavior is normal.   Vitals reviewed. Assessment:      Itz Gómez was seen today for diabetes and hypertension. Diagnoses and all orders for this visit:    Essential hypertension  Controlled   -     NIFEdipine (NIFEDICAL XL) 60 MG extended release tablet; Take 1 tablet by mouth 2 times daily  -     cloNIDine (CATAPRES) 0.2 MG tablet; Take 1 tablet by mouth 2 times daily    Type 2 diabetes mellitus without complication, without long-term current use of insulin ([facility])  Last A1C 6.3  -     glimepiride (AMARYL) 1 MG tablet; Take 1 tablet by mouth every morning    Encounter for hepatitis C virus screening test for high risk patient  -     Hepatitis C Antibody; Future  -     HIV-1 AND HIV-2 ANTIBODIES; Future    Kidney transplanted  -     PSA, Total and Free; Future  -     Urinalysis; Future      High priority for COVID-19 virus vaccination            Renal insufficiency   Progressive . on Cellcept .  Being evaluated for another transplant C/O Dr Darci Estrada

## 2021-01-28 ENCOUNTER — PATIENT MESSAGE (OUTPATIENT)
Dept: PRIMARY CARE CLINIC | Age: 56
End: 2021-01-28

## 2021-01-28 DIAGNOSIS — I10 ESSENTIAL HYPERTENSION: ICD-10-CM

## 2021-01-28 RX ORDER — NIFEDIPINE 60 MG/1
60 TABLET, EXTENDED RELEASE ORAL 2 TIMES DAILY
Qty: 180 TABLET | Refills: 1 | Status: SHIPPED | OUTPATIENT
Start: 2021-01-28 | End: 2022-04-11 | Stop reason: SDUPTHER

## 2021-01-28 RX ORDER — CLONIDINE HYDROCHLORIDE 0.2 MG/1
0.2 TABLET ORAL 2 TIMES DAILY
Qty: 180 TABLET | Refills: 1 | Status: SHIPPED | OUTPATIENT
Start: 2021-01-28 | End: 2021-11-08

## 2021-01-28 RX ORDER — NIFEDIPINE 60 MG/1
60 TABLET, EXTENDED RELEASE ORAL 2 TIMES DAILY
Qty: 180 TABLET | Refills: 1 | Status: SHIPPED | OUTPATIENT
Start: 2021-01-28 | End: 2021-01-28 | Stop reason: SDUPTHER

## 2021-01-28 RX ORDER — CLONIDINE HYDROCHLORIDE 0.2 MG/1
0.2 TABLET ORAL 2 TIMES DAILY
Qty: 180 TABLET | Refills: 1 | Status: SHIPPED | OUTPATIENT
Start: 2021-01-28 | End: 2021-01-28 | Stop reason: SDUPTHER

## 2021-01-28 NOTE — TELEPHONE ENCOUNTER
From: Seema Le. To: Judith Hauser MD  Sent: 1/28/2021 1:44 PM EST  Subject: Prescription Question    [facility] day [facility]. Its needs to be a ninety day so there able to fill it.

## 2021-03-31 ENCOUNTER — TELEPHONE (OUTPATIENT)
Dept: PRIMARY CARE CLINIC | Age: 56
End: 2021-03-31

## 2021-03-31 ENCOUNTER — HOSPITAL ENCOUNTER (OUTPATIENT)
Dept: VASCULAR LAB | Age: 56
Discharge: HOME | End: 2021-03-31
Payer: COMMERCIAL

## 2021-03-31 DIAGNOSIS — M79.89 PAIN AND SWELLING OF RIGHT LOWER LEG: ICD-10-CM

## 2021-03-31 DIAGNOSIS — M79.661 PAIN AND SWELLING OF RIGHT LOWER LEG: ICD-10-CM

## 2021-03-31 DIAGNOSIS — M79.89 PAIN AND SWELLING OF RIGHT LOWER LEG: Primary | ICD-10-CM

## 2021-03-31 DIAGNOSIS — M79.661 PAIN AND SWELLING OF RIGHT LOWER LEG: Primary | ICD-10-CM

## 2021-03-31 PROCEDURE — 93971 EXTREMITY STUDY: CPT

## 2021-06-07 DIAGNOSIS — E11.9 TYPE 2 DIABETES MELLITUS WITHOUT COMPLICATION, WITHOUT LONG-TERM CURRENT USE OF INSULIN (HCC): ICD-10-CM

## 2021-06-07 RX ORDER — GLIMEPIRIDE 1 MG/1
TABLET ORAL
Qty: 90 TABLET | Refills: 3 | Status: SHIPPED | OUTPATIENT
Start: 2021-06-07 | End: 2022-04-11

## 2021-06-07 NOTE — TELEPHONE ENCOUNTER
Medication:   Requested Prescriptions     Pending Prescriptions Disp Refills    glimepiride (AMARYL) 1 MG tablet [Pharmacy Med Name: GLIMEPIRIDE TABS 1MG] 90 tablet 3     Sig: TAKE 1 TABLET EVERY MORNING        Last Filled:      Patient Phone Number: [phone] (home) [phone] (work)    Last appt: 1/27/2021   Next appt: Visit date not found    Last OARRS: No flowsheet data found.

## 2021-07-29 ENCOUNTER — OFFICE VISIT (OUTPATIENT)
Dept: PRIMARY CARE CLINIC | Age: 56
End: 2021-07-29
Payer: COMMERCIAL

## 2021-07-29 ENCOUNTER — NURSE TRIAGE (OUTPATIENT)
Dept: OTHER | Facility: CLINIC | Age: 56
End: 2021-07-29

## 2021-07-29 VITALS
BODY MASS INDEX: 29.7 KG/M2 | SYSTOLIC BLOOD PRESSURE: 134 MMHG | DIASTOLIC BLOOD PRESSURE: 74 MMHG | HEART RATE: 82 BPM | OXYGEN SATURATION: 100 % | WEIGHT: 207 LBS

## 2021-07-29 DIAGNOSIS — B35.9 TINEA: Primary | ICD-10-CM

## 2021-07-29 PROCEDURE — 99212 OFFICE O/P EST SF 10 MIN: CPT | Performed by: INTERNAL MEDICINE

## 2021-07-29 RX ORDER — KETOCONAZOLE 20 MG/G
CREAM TOPICAL
Qty: 30 G | Refills: 1 | Status: SHIPPED | OUTPATIENT
Start: 2021-07-29

## 2021-07-29 SDOH — ECONOMIC STABILITY: FOOD INSECURITY: WITHIN THE PAST 12 MONTHS, THE FOOD YOU BOUGHT JUST DIDN'T LAST AND YOU DIDN'T HAVE MONEY TO GET MORE.: NEVER TRUE

## 2021-07-29 SDOH — ECONOMIC STABILITY: FOOD INSECURITY: WITHIN THE PAST 12 MONTHS, YOU WORRIED THAT YOUR FOOD WOULD RUN OUT BEFORE YOU GOT MONEY TO BUY MORE.: NEVER TRUE

## 2021-07-29 ASSESSMENT — SOCIAL DETERMINANTS OF HEALTH (SDOH): HOW HARD IS IT FOR YOU TO PAY FOR THE VERY BASICS LIKE FOOD, HOUSING, MEDICAL CARE, AND HEATING?: NOT VERY HARD

## 2021-07-29 NOTE — PROGRESS NOTES
2021    Michele Ocasio (:  1965) is a 48 y.o. male, here for evaluation of the following medical concerns:    Chief Complaint   Patient presents with    Rash     groin started Monday       HPI  60-year-old -American male kidney transplant recipient since , with history of diabetes, right toe ulcer osteomyelitis requiring amputation 2021, on belatacept mycophenolate, who comes in for a itchy groin and scrotal rash. He has had this problem before and has prescriptions for miconazole and clotrimazole but both prescriptions are couple years old. Compounding matters, his belt cut into his belly while he was mowing last week, and it opened a foot and a half wide though narrow and shallow intertriginous erosion under his abdominal pannus. He started using the  cream and powder this weekend. Review of Systems   Constitutional: Negative for activity change, appetite change, fatigue and unexpected weight change. HENT: Negative for dental problem, sinus pain, sore throat and trouble swallowing. Eyes: Negative for pain and visual disturbance. Respiratory: Negative for apnea, cough, chest tightness, shortness of breath and wheezing. Cardiovascular: Negative for chest pain and palpitations. Gastrointestinal: Negative for abdominal pain, blood in stool, constipation, diarrhea, nausea, rectal pain and vomiting. Endocrine: Negative for cold intolerance, heat intolerance, polydipsia, polyphagia and polyuria. Genitourinary: Negative for difficulty urinating, dysuria, flank pain, frequency, hematuria, pelvic pain, urgency. Musculoskeletal: Negative for arthralgias, back pain, gait problem, joint swelling, myalgias, neck pain and neck stiffness. Skin: Negative for color change and rash. Neurological: Negative for dizziness, tremors, syncope, speech difficulty, weakness, light-headedness and headaches. Hematological: Negative for adenopathy.  Does not bruise/bleed easily. Psychiatric/Behavioral: Negative for agitation, behavioral problems, decreased concentration, sleep disturbance and suicidal ideas. The patient is not nervous/anxious and is not hyperactive. Prior to Visit Medications    Medication Sig Taking? Authorizing Provider   COVID-19 mRNA Vacc, Moderna, 100 MCG/0.5ML SUSP injection Inject 0.5 mLs into the muscle Yes Historical Provider, MD   ketoconazole (NIZORAL) 2 % cream Apply topically daily. Yes Junior Gil MD   glimepiride (AMARYL) 1 MG tablet TAKE 1 TABLET EVERY MORNING Yes Deb Wasserman MD   NIFEdipine (NIFEDICAL XL) 60 MG extended release tablet Take 1 tablet by mouth 2 times daily Yes Deb Wasserman MD   cloNIDine (CATAPRES) 0.2 MG tablet Take 1 tablet by mouth 2 times daily Yes Deb Wasserman MD   methocarbamol (ROBAXIN) 750 MG tablet Take 750 mg by mouth 4 times daily Yes Historical Provider, MD   tamsulosin (FLOMAX) 0.4 MG capsule Take 1 capsule by mouth daily  Patient taking differently: Take 0.4 mg by mouth 2 times daily  Yes Deb Wasserman MD   vitamin D (CHOLECALCIFEROL) 1000 UNIT TABS tablet Take 1,000 Units by mouth daily Yes Historical Provider, MD   sodium bicarbonate 650 MG tablet Take 650 mg by mouth 4 times daily Yes Historical Provider, MD   aspirin 81 MG tablet Take 81 mg by mouth daily Yes Historical Provider, MD   docusate sodium (COLACE) 100 MG capsule Take 100 mg by mouth 2 times daily Yes Historical Provider, MD   Epoetin Carl (EPOGEN IJ) Inject as directed Yes Historical Provider, MD   belatacept (NULOJIX) 250 MG SOLR Infuse intravenously Yes Historical Provider, MD   clotrimazole-betamethasone (LOTRISONE) 1-0.05 % cream Apply topically 2 times daily. Yes Deb Wasserman MD   miconazole (ZEASORB-AF) 2 % powder Apply topically 2 times daily.  Yes Deb Wasserman MD   Febuxostat (ULORIC) 80 MG TABS Take 80 mg by mouth daily Yes Estrellita Munguia MD   mycophenolate (CELLCEPT) 250 MG capsule Current or Ex-Partner:     Emotionally Abused:     Physically Abused:     Sexually Abused:         Family History   Problem Relation Age of Onset    Diabetes Mother     Diabetes Father     Diabetes Sister     Diabetes Brother        Vitals:    07/29/21 1534   BP: 134/74   Pulse: 82   SpO2: 100%   Weight: 207 lb (93.9 kg)     Estimated body mass index is 29.7 kg/m² as calculated from the following:    Height as of 1/27/21: 5' 10\" (1.778 m). Weight as of this encounter: 207 lb (93.9 kg). PHYSICAL EXAM  GENERAL:  Pleasant  male who looks his stated age, awake alert and oriented x3, no acute distress. HEENT:  Normocephalic atraumatic. Pupils equal round reactive light and accommodation, extra ocular muscles are intact. Oropharynx is clear moist without injection or exudate. Tongue and palate move normally. Turbinates appear normal.  Tympanic membranes appear normal.  NECK:  Supple nontender. No carotid bruits. Brisk carotid upstrokes, no JVD. No thyromegaly. LYMPH:  No supraclavicular cervical axillary or inguinal lymphadenopathy. LUNGS:  Clear to auscultation bilaterally. Excellent air entry. No inspiratory crackles or expiratory wheezes. HEART:  Regular rate and rhythm without pathologic murmur rub gallop S3 or S4. ABDOMEN:  Soft, nontender. Normal bowel sounds. No guarding. No masses. UROGENITAL:  Deferred  EXTREMITIES:  Warm and well perfused without clubbing cyanosis or edema. 2+ pulses in all 4 extremities. Capillary refill less than 2 seconds. NEURO:  Cranial nerves 2-12 grossly intact. Normal muscle bulk and tone. No resting tremor, cogwheeling, normal rapid alternating movements in the hands and feet. No stocking paresthesia. Normal gait and station. MUSCULOSKELETAL:  Mild osteoarthritic changes. SKIN: Abdominal and bilateral groin, scrotum intertriginous erythema with some satellite lesions without induration purulent drainage or crepitus.   No groin lymphadenopathy. Clean-based shallow erosion at the skin fold just below his abdominal pannus. PSYCH:  No psychomotor retardation or agitation. Good eye contact. Unrestricted affect range. Mood congruent with affect. Linear thought. LABS  Lab Review   Abstract on 06/23/2021   Component Date Value    Hemoglobin A1C 01/11/2021 6.3    Orders Only on 12/14/2020   Component Date Value    Visual Acuity Distance R* 12/14/2020 20/20     Visual Acuity Distance L* 12/14/2020 20/20     Intraocular Pressure Eye 12/14/2020                      Value:20  19      Diabetic Retinopathy 12/14/2020 NEGATIVE     Cataracts 12/14/2020 POSITIVE     Glaucoma 12/14/2020 NEGATIVE    Abstract on 12/11/2020   Component Date Value    Hemoglobin A1C 02/18/2020 8.7          ASSESSMENT/PLAN  1. Tinea  Patient to purchase interdry on [employer] to apply into his groins and under his pannus during the day. At night prescription for ketoconazole cream sent to his pharmacy. Suggested that he keep the area ventilated well at home. Given immunocompromise status will watch for signs of purulence currently none that would invite systemic antimicrobial therapy; he is applying Neosporin ointment to the open area under his abdominal pannus. No follow-ups on file. It was a pleasure to visit with Mr. Carlos Enrique Pacheco today. Answered all questions as best I could.   Gino Mason MD   Time 15 minutes

## 2021-07-29 NOTE — TELEPHONE ENCOUNTER
Received call  at [facility] with Red Flag Complaint. Brief description of triage: rash to testicles and difficulty breathing states the rash is worse than breathing difficulty this is mild    Triage indicates for patient to be seen today if no available appointments to go to [facility]     Care advice provided, patient verbalizes understanding; denies any other questions or concerns; instructed to call back for any new or worsening symptoms. Writer provided warm transfer to margoth at [facility] for appointment scheduling. Attention Provider: Thank you for allowing me to participate in the care of your patient. The patient was connected to triage in response to information provided to the [facility]. Please do not respond through this encounter as the response is not directed to a shared pool. Reason for Disposition   Patient wants to be seen    Answer Assessment - Initial Assessment Questions  1. APPEARANCE of RASH: \"Describe the rash. \"       Red and noted today     2. LOCATION: \"Where is the rash located? \"       To scrotum itself     3. NUMBER: \"How many spots are there? \"       One area   Noted to the right side     4. SIZE: \"How big are the spots? \" (Inches, centimeters or compare to size of a coin)       The size of the fist     5. ONSET: \"When did the rash start? \"       Today     6. ITCHING: \"Does the rash itch? \" If so, ask: \"How bad is the itch? \"  (Scale 1-10; or mild, moderate, severe)      None    7. PAIN: \"Does the rash hurt? \" If so, ask: \"How bad is the pain? \"  (Scale 1-10; or mild, moderate, severe)      Yes a little burning it is 10 out of 10     8. OTHER SYMPTOMS: \"Do you have any other symptoms? \" (e.g., fever)      None   9. PREGNANCY: \"Is there any chance you are pregnant? \" \"When was your last menstrual period? \"      Na    Protocols used: RASH OR REDNESS - LOCALIZED-ADULT-[place]

## 2021-11-04 DIAGNOSIS — I10 ESSENTIAL HYPERTENSION: ICD-10-CM

## 2021-11-04 NOTE — TELEPHONE ENCOUNTER
Medication:   Requested Prescriptions     Pending Prescriptions Disp Refills    cloNIDine (CATAPRES) 0.2 MG tablet [Pharmacy Med Name: CLONIDINE HCL TABS 0.2MG] 180 tablet 3     Sig: TAKE 1 TABLET TWICE A DAY        Last Filled:      Patient Phone Number: [phone] (home) [phone] (work)    Last appt: 7/29/2021   Next appt: Visit date not found    Last OARRS: No flowsheet data found.

## 2021-11-08 RX ORDER — CLONIDINE HYDROCHLORIDE 0.2 MG/1
TABLET ORAL
Qty: 180 TABLET | Refills: 3 | Status: SHIPPED | OUTPATIENT
Start: 2021-11-08 | End: 2022-04-11 | Stop reason: SDUPTHER

## 2022-03-21 PROBLEM — M10.9 ACUTE GOUT OF RIGHT ANKLE: Status: RESOLVED | Noted: 2018-06-08 | Resolved: 2022-03-21

## 2022-03-21 PROBLEM — N18.4 CKD (CHRONIC KIDNEY DISEASE) STAGE 4, GFR 15-29 ML/MIN (HCC): Status: ACTIVE | Noted: 2020-03-02

## 2022-04-11 ENCOUNTER — OFFICE VISIT (OUTPATIENT)
Dept: PRIMARY CARE CLINIC | Age: 57
End: 2022-04-11
Payer: COMMERCIAL

## 2022-04-11 VITALS
HEIGHT: 70 IN | WEIGHT: 217.2 LBS | BODY MASS INDEX: 31.09 KG/M2 | OXYGEN SATURATION: 99 % | DIASTOLIC BLOOD PRESSURE: 68 MMHG | TEMPERATURE: 97.1 F | HEART RATE: 66 BPM | SYSTOLIC BLOOD PRESSURE: 116 MMHG

## 2022-04-11 DIAGNOSIS — E11.9 TYPE 2 DIABETES MELLITUS WITHOUT COMPLICATION, WITHOUT LONG-TERM CURRENT USE OF INSULIN (HCC): ICD-10-CM

## 2022-04-11 DIAGNOSIS — I10 ESSENTIAL HYPERTENSION: ICD-10-CM

## 2022-04-11 DIAGNOSIS — Z01.818 PREOP EXAM FOR INTERNAL MEDICINE: ICD-10-CM

## 2022-04-11 LAB — HBA1C MFR BLD: 6 %

## 2022-04-11 PROCEDURE — 83036 HEMOGLOBIN GLYCOSYLATED A1C: CPT | Performed by: INTERNAL MEDICINE

## 2022-04-11 PROCEDURE — 99214 OFFICE O/P EST MOD 30 MIN: CPT | Performed by: INTERNAL MEDICINE

## 2022-04-11 PROCEDURE — 3044F HG A1C LEVEL LT 7.0%: CPT | Performed by: INTERNAL MEDICINE

## 2022-04-11 RX ORDER — NIFEDIPINE 60 MG/1
60 TABLET, EXTENDED RELEASE ORAL 2 TIMES DAILY
Qty: 180 TABLET | Refills: 1 | Status: SHIPPED | OUTPATIENT
Start: 2022-04-11

## 2022-04-11 RX ORDER — CLONIDINE HYDROCHLORIDE 0.2 MG/1
0.2 TABLET ORAL 2 TIMES DAILY
Qty: 180 TABLET | Refills: 3 | Status: SHIPPED | OUTPATIENT
Start: 2022-04-11

## 2022-04-11 RX ORDER — GLIMEPIRIDE 1 MG/1
0.5 TABLET ORAL
Qty: 90 TABLET | Refills: 3 | Status: SHIPPED | OUTPATIENT
Start: 2022-04-11

## 2022-04-11 ASSESSMENT — ENCOUNTER SYMPTOMS
SHORTNESS OF BREATH: 0
COUGH: 0
WHEEZING: 0
EYES NEGATIVE: 1
CHEST TIGHTNESS: 0
GASTROINTESTINAL NEGATIVE: 1

## 2022-04-11 ASSESSMENT — PATIENT HEALTH QUESTIONNAIRE - PHQ9
1. LITTLE INTEREST OR PLEASURE IN DOING THINGS: 0
SUM OF ALL RESPONSES TO PHQ QUESTIONS 1-9: 1
2. FEELING DOWN, DEPRESSED OR HOPELESS: 1
SUM OF ALL RESPONSES TO PHQ QUESTIONS 1-9: 1
SUM OF ALL RESPONSES TO PHQ9 QUESTIONS 1 & 2: 1

## 2022-04-11 NOTE — PROGRESS NOTES
Subjective:      Patient ID: Chong Santoro. is a 55 y.o. male. 4/11/2022 Patient presents with:  Pre-op Exam: Cystoscopy urolift, surgery 5/9/2022, Dr Michele Woodward, [facility]    S/P kidney transplant 2014  No  Personal or FH of any issues  with anaesthesia     Had Complete Cardiac W/U 2/22 At [facility] also labs       1/27/2021 Patient presents with:  Diabetes: a1c- 1/11/2021-6.3 taken at the [address] [facility]  Hypertension              Last seen  1/8/2020 Patient presents with:  Stephania Goldmann    Was seen in ER 12/28/19 with rt sided flank pain . ? Hernia . Pain continues! Last seen  10/1/19          Surgery    7/17/17 Dr Bessie Closs Cystoscopy  H/O repeat UTIs         S/p kidney transplant 2014           Diabetes  Pertinent negatives for hypoglycemia include no dizziness, headaches, nervousness/anxiousness or tremors. Pertinent negatives for diabetes include no fatigue and no weakness. Hypertension  Pertinent negatives include no headaches or shortness of breath. Review of Systems   Constitutional: Negative for chills, fatigue and fever. Tdap 2013    Hep A & B vaccination complete      Pneumonia vac 2017 ; 2018    Flu vac 9/20 at [place] [address] 8/21 third booster    Shingrex 2019   HENT: Negative. Dental exam  Reg    Eyes: Negative. Eye Ex  12/21   Respiratory: Negative for cough, chest tightness, shortness of breath and wheezing. Does not smoke . Etoh occ   Cardiovascular: Negative. No CAD ; Complete Cardiac W/U at [facility] 2/22     Fairly active ; Swims     Cardiac W/U  At [facility] 2/22  For Renal Transplant    Gastrointestinal: Negative. No FH of ca colon . colonoscopy 12/13 ; Dr Shanna Jay    Genitourinary: Negative for dysuria, frequency and urgency. S/P Renal Transplant  12/14 . Waiting for new Kidney             Musculoskeletal: Negative. Skin: Negative for rash.    Neurological: Negative for dizziness, tremors, weakness, light-headedness and headaches. Psychiatric/Behavioral: Negative for behavioral problems and sleep disturbance. The patient is not nervous/anxious. Objective:   Physical Exam  Vitals reviewed. Constitutional:       General: He is not in acute distress. Eyes:      Extraocular Movements: Extraocular movements intact. Comments: I   Neck:      Vascular: No carotid bruit. Cardiovascular:      Rate and Rhythm: Normal rate and regular rhythm. Heart sounds: Normal heart sounds. No friction rub. Pulmonary:      Breath sounds: Normal breath sounds. Abdominal:      General: There is distension. Musculoskeletal:         General: Normal range of motion. Cervical back: Neck supple. Skin:     General: Skin is warm. Neurological:      Mental Status: He is alert and oriented to person, place, and time. Mental status is at baseline. Psychiatric:         Behavior: Behavior normal.         Assessment:       Renetta Hughes was seen today for pre-op exam and diabetes. Diagnoses and all orders for this visit:    Preop exam for internal medicine  Cleared     Type 2 diabetes mellitus without complication, without long-term current use of insulin ([facility])  A1C 6.0 . Reduce Amaryl to 0.5 mg / d      -     POCT glycosylated hemoglobin (Hb A1C)  -     glimepiride (AMARYL) 1 MG tablet; Take 0.5 tablets by mouth every morning (before breakfast)    Essential hypertension   Controlled   -     cloNIDine (CATAPRES) 0.2 MG tablet; Take 1 tablet by mouth 2 times daily  -     NIFEdipine (NIFEDICAL XL) 60 MG extended release tablet; Take 1 tablet by mouth 2 times daily              Renal insufficiency   Progressive . on Cellcept .  Being evaluated for another transplant C/O Dr Tara Varghese

## 2022-12-01 LAB
AVERAGE GLUCOSE: NORMAL
HBA1C MFR BLD: 6.2 %

## 2022-12-20 ENCOUNTER — OFFICE VISIT (OUTPATIENT)
Dept: PRIMARY CARE CLINIC | Age: 57
End: 2022-12-20
Payer: COMMERCIAL

## 2022-12-20 VITALS
BODY MASS INDEX: 31.41 KG/M2 | HEART RATE: 76 BPM | WEIGHT: 219.4 LBS | HEIGHT: 70 IN | DIASTOLIC BLOOD PRESSURE: 76 MMHG | TEMPERATURE: 97.4 F | SYSTOLIC BLOOD PRESSURE: 135 MMHG | OXYGEN SATURATION: 100 %

## 2022-12-20 DIAGNOSIS — Z01.818 PREOP EXAM FOR INTERNAL MEDICINE: Primary | ICD-10-CM

## 2022-12-20 PROCEDURE — 3074F SYST BP LT 130 MM HG: CPT | Performed by: INTERNAL MEDICINE

## 2022-12-20 PROCEDURE — 3078F DIAST BP <80 MM HG: CPT | Performed by: INTERNAL MEDICINE

## 2022-12-20 PROCEDURE — 99214 OFFICE O/P EST MOD 30 MIN: CPT | Performed by: INTERNAL MEDICINE

## 2022-12-20 SDOH — ECONOMIC STABILITY: FOOD INSECURITY: WITHIN THE PAST 12 MONTHS, THE FOOD YOU BOUGHT JUST DIDN'T LAST AND YOU DIDN'T HAVE MONEY TO GET MORE.: NEVER TRUE

## 2022-12-20 SDOH — ECONOMIC STABILITY: FOOD INSECURITY: WITHIN THE PAST 12 MONTHS, YOU WORRIED THAT YOUR FOOD WOULD RUN OUT BEFORE YOU GOT MONEY TO BUY MORE.: NEVER TRUE

## 2022-12-20 ASSESSMENT — ENCOUNTER SYMPTOMS
WHEEZING: 0
COUGH: 0
CHEST TIGHTNESS: 0
GASTROINTESTINAL NEGATIVE: 1
SHORTNESS OF BREATH: 0
EYES NEGATIVE: 1

## 2022-12-20 ASSESSMENT — SOCIAL DETERMINANTS OF HEALTH (SDOH): HOW HARD IS IT FOR YOU TO PAY FOR THE VERY BASICS LIKE FOOD, HOUSING, MEDICAL CARE, AND HEATING?: NOT HARD AT ALL

## 2022-12-20 NOTE — PROGRESS NOTES
Subjective:      Patient ID: Lamont Garcia. is a 65 y.o. male. 12/20/2022 Patient presents with:  Pre-op Exam: Cystoscopy Transurethral Resection Of The Prostate, [facility], Surgery 1/13/2023, Dr Andrea Putnam    S/P kidney transplant 2014  No  Personal or FH of any issues  with anaesthesia or bleeding disorders   Labs and EKG  12/16/22 at [facility]    Had Complete Cardiac W/U 2/22 At [address] [place] [address] seen  4/11/2022 Patient presents with:  Pre-op Exam: Cystoscopy urolift, surgery 5/9/2022, Dr Andrea Putnam, [facility]          1/27/2021 Patient presents with:  Diabetes: a1c- 1/11/2021-6.3 taken at the [facility]  Hypertension              Last seen  1/8/2020 Patient presents with:  Lara Montiel    Was seen in ER 12/28/19 with rt sided flank pain . ? Hernia . Pain continues! Last seen  10/1/19          Surgery    7/17/17 Dr Aleksandr Alegre Cystoscopy  H/O repeat UTIs         S/p kidney transplant 2014           Diabetes  Pertinent negatives for hypoglycemia include no dizziness, headaches, nervousness/anxiousness or tremors. Pertinent negatives for diabetes include no fatigue and no weakness. Hypertension  Pertinent negatives include no headaches or shortness of breath. Review of Systems   Constitutional:  Negative for chills, fatigue and fever. Tdap 2013    Hep A & B vaccination complete      Pneumonia vac 2017 ; 2018    Flu vac 10/22     Covid vac 10/22  [place] vac     Shingrex   6/19 complete   HENT: Negative. Dental exam  Reg    Eyes: Negative. Eye Ex  scheduled  12/22   Respiratory:  Negative for cough, chest tightness, shortness of breath and wheezing. Does not smoke . Etoh occ   Cardiovascular: Negative. No CAD ; Complete Cardiac W/U at [facility] 2/22     Fairly active ; Swims     Cardiac W/U  At [facility] 2/22  For Renal Transplant    Gastrointestinal: Negative. No FH of ca colon .    colonoscopy 12/13 ; Dr Delroy Davison Genitourinary:  Positive for frequency and urgency. Negative for dysuria. S/P Renal Transplant  12/14 . Waiting for new Kidney             Musculoskeletal: Negative. Skin:  Negative for rash. Neurological:  Negative for dizziness, tremors, weakness, light-headedness and headaches. Psychiatric/Behavioral:  Negative for behavioral problems and sleep disturbance. The patient is not nervous/anxious. Objective:   Physical Exam  Vitals reviewed. Constitutional:       General: He is not in acute distress. Appearance: He is not ill-appearing. Eyes:      Extraocular Movements: Extraocular movements intact. Neck:      Vascular: No carotid bruit. Cardiovascular:      Rate and Rhythm: Normal rate and regular rhythm. Heart sounds: Normal heart sounds. No friction rub. Pulmonary:      Breath sounds: Normal breath sounds. Abdominal:      General: There is distension. Musculoskeletal:         General: Normal range of motion. Cervical back: Neck supple. Right lower leg: No edema. Left lower leg: No edema. Skin:     General: Skin is warm. Neurological:      Mental Status: He is alert and oriented to person, place, and time. Mental status is at baseline. Psychiatric:         Mood and Affect: Mood normal.         Behavior: Behavior normal.       Assessment:       [address] [place] [address] was seen today for pre-op exam and diabetes. Diagnoses and all orders for this visit:    Preop exam for internal medicine  Cleared     Type 2 diabetes mellitus without complication, without long-term current use of insulin ([facility])  A1C 6.2   Amaryl 0.5 mg / d      -     POCT glycosylated hemoglobin (Hb A1C)  -     glimepiride (AMARYL) 1 MG tablet; Take 0.5 tablets by mouth every morning (before breakfast)    Essential hypertension   Controlled   -     cloNIDine (CATAPRES) 0.2 MG tablet; Take 1 tablet by mouth 2 times daily  -     NIFEdipine (NIFEDICAL XL) 60 MG extended release tablet;  Take 1 tablet by mouth 2 times daily              Renal insufficiency   Progressive . on Cellcept .  Being evaluated for another transplant C/O Dr Princess Dueñas

## 2023-04-06 DIAGNOSIS — I10 ESSENTIAL HYPERTENSION: ICD-10-CM

## 2023-04-06 RX ORDER — CLONIDINE HYDROCHLORIDE 0.2 MG/1
TABLET ORAL
Qty: 180 TABLET | Refills: 3 | Status: SHIPPED | OUTPATIENT
Start: 2023-04-06

## 2023-04-06 NOTE — TELEPHONE ENCOUNTER
Medication:   Requested Prescriptions     Pending Prescriptions Disp Refills    cloNIDine (CATAPRES) 0.2 MG tablet [Pharmacy Med Name: CLONIDINE HCL TABS 0.2MG] 180 tablet 3     Sig: TAKE 1 TABLET TWICE A DAY        Last Filled:      Patient Phone Number: [phone] (home) [phone] (work)    Last appt: 12/20/2022   Next appt: Visit date not found    Last OARRS: No flowsheet data found. Will fill out home monitor forms and have MP sign.  Meli Whitten RN

## 2023-07-04 DIAGNOSIS — E11.9 TYPE 2 DIABETES MELLITUS WITHOUT COMPLICATION, WITHOUT LONG-TERM CURRENT USE OF INSULIN (HCC): ICD-10-CM

## 2023-07-05 RX ORDER — GLIMEPIRIDE 1 MG/1
TABLET ORAL
Qty: 90 TABLET | Refills: 3 | Status: SHIPPED | OUTPATIENT
Start: 2023-07-05

## 2023-07-05 NOTE — TELEPHONE ENCOUNTER
Medication:   Requested Prescriptions     Pending Prescriptions Disp Refills    glimepiride (AMARYL) 1 MG tablet [Pharmacy Med Name: GLIMEPIRIDE TABS 1MG] 90 tablet 3     Sig: TAKE ONE-HALF (1/2) TABLET EVERY MORNING BEFORE BREAKFAST        Last Filled:      Patient Phone Number: [phone] (home) [phone] (work)    Last appt: 12/20/2022   Next appt: Visit date not found    Last OARRS: No flowsheet data found.

## 2023-08-10 LAB — HBA1C MFR BLD HPLC: 6.8 %

## 2023-12-07 ENCOUNTER — OFFICE VISIT (OUTPATIENT)
Dept: PRIMARY CARE CLINIC | Age: 58
End: 2023-12-07
Payer: COMMERCIAL

## 2023-12-07 VITALS
DIASTOLIC BLOOD PRESSURE: 70 MMHG | WEIGHT: 223 LBS | BODY MASS INDEX: 31.92 KG/M2 | HEART RATE: 90 BPM | SYSTOLIC BLOOD PRESSURE: 130 MMHG | OXYGEN SATURATION: 98 % | HEIGHT: 70 IN | TEMPERATURE: 97.9 F

## 2023-12-07 DIAGNOSIS — E11.9 TYPE 2 DIABETES MELLITUS WITHOUT COMPLICATION, WITHOUT LONG-TERM CURRENT USE OF INSULIN (HCC): ICD-10-CM

## 2023-12-07 DIAGNOSIS — Z00.00 PE (PHYSICAL EXAM), ANNUAL: Primary | ICD-10-CM

## 2023-12-07 DIAGNOSIS — I10 ESSENTIAL HYPERTENSION: ICD-10-CM

## 2023-12-07 PROCEDURE — 99396 PREV VISIT EST AGE 40-64: CPT | Performed by: INTERNAL MEDICINE

## 2023-12-07 PROCEDURE — 3078F DIAST BP <80 MM HG: CPT | Performed by: INTERNAL MEDICINE

## 2023-12-07 PROCEDURE — 3075F SYST BP GE 130 - 139MM HG: CPT | Performed by: INTERNAL MEDICINE

## 2023-12-07 RX ORDER — ATORVASTATIN CALCIUM 20 MG/1
20 TABLET, FILM COATED ORAL DAILY
Qty: 90 TABLET | Refills: 1 | Status: SHIPPED | OUTPATIENT
Start: 2023-12-07

## 2023-12-07 RX ORDER — GLIMEPIRIDE 1 MG/1
0.5 TABLET ORAL
Qty: 90 TABLET | Refills: 3 | Status: SHIPPED | OUTPATIENT
Start: 2023-12-07

## 2023-12-07 RX ORDER — NIFEDIPINE 60 MG/1
60 TABLET, EXTENDED RELEASE ORAL 2 TIMES DAILY
Qty: 180 TABLET | Refills: 1 | Status: SHIPPED | OUTPATIENT
Start: 2023-12-07

## 2023-12-07 RX ORDER — CLONIDINE HYDROCHLORIDE 0.2 MG/1
0.2 TABLET ORAL 2 TIMES DAILY
Qty: 180 TABLET | Refills: 3 | Status: SHIPPED | OUTPATIENT
Start: 2023-12-07

## 2023-12-07 SDOH — ECONOMIC STABILITY: HOUSING INSECURITY
IN THE LAST 12 MONTHS, WAS THERE A TIME WHEN YOU DID NOT HAVE A STEADY PLACE TO SLEEP OR SLEPT IN A SHELTER (INCLUDING NOW)?: NO

## 2023-12-07 SDOH — ECONOMIC STABILITY: FOOD INSECURITY: WITHIN THE PAST 12 MONTHS, YOU WORRIED THAT YOUR FOOD WOULD RUN OUT BEFORE YOU GOT MONEY TO BUY MORE.: NEVER TRUE

## 2023-12-07 SDOH — ECONOMIC STABILITY: INCOME INSECURITY: HOW HARD IS IT FOR YOU TO PAY FOR THE VERY BASICS LIKE FOOD, HOUSING, MEDICAL CARE, AND HEATING?: NOT HARD AT ALL

## 2023-12-07 SDOH — ECONOMIC STABILITY: FOOD INSECURITY: WITHIN THE PAST 12 MONTHS, THE FOOD YOU BOUGHT JUST DIDN'T LAST AND YOU DIDN'T HAVE MONEY TO GET MORE.: NEVER TRUE

## 2023-12-07 ASSESSMENT — ENCOUNTER SYMPTOMS
SHORTNESS OF BREATH: 0
WHEEZING: 0
COUGH: 0
GASTROINTESTINAL NEGATIVE: 1
CHEST TIGHTNESS: 0
EYES NEGATIVE: 1

## 2023-12-07 ASSESSMENT — PATIENT HEALTH QUESTIONNAIRE - PHQ9
SUM OF ALL RESPONSES TO PHQ9 QUESTIONS 1 & 2: 0
SUM OF ALL RESPONSES TO PHQ QUESTIONS 1-9: 0
2. FEELING DOWN, DEPRESSED OR HOPELESS: 0
1. LITTLE INTEREST OR PLEASURE IN DOING THINGS: 0

## 2023-12-07 NOTE — PROGRESS NOTES
Subjective:      Patient ID: Cliff Robles. is a 63 y.o. male. 12/7/2023 Patient presents with: Annual Exam                  12/20/2022 Patient presents with:  Pre-op Exam: Cystoscopy Transurethral Resection Of The Prostate, [facility], Surgery 1/13/2023, Dr Maricruz Timmons    S/P kidney transplant 2014  No  Personal or FH of any issues  with anaesthesia or bleeding disorders   Labs and EKG  12/16/22 at [facility]    Had Complete Cardiac W/U 2/22 At [address] [place] [address] seen  4/11/2022 Patient presents with:  Pre-op Exam: Cystoscopy urolift, surgery 5/9/2022, Dr Maricruz Timmons, [facility]          1/27/2021 Patient presents with:  Diabetes: a1c- 1/11/2021-6.3 taken at the [address] [place] [address]  Hypertension              Last seen  1/8/2020 Patient presents with:  Romero Barrientos    Was seen in ER 12/28/19 with rt sided flank pain . ? Hernia . Pain continues! Last seen  10/1/19          Surgery    7/17/17 Dr Sharan Spencer Cystoscopy  H/O repeat UTIs         S/p kidney transplant 2014           Diabetes  Pertinent negatives for hypoglycemia include no dizziness, headaches, nervousness/anxiousness or tremors. Pertinent negatives for diabetes include no fatigue and no weakness. Hypertension  Pertinent negatives include no headaches or shortness of breath. Review of Systems   Constitutional:  Negative for chills, fatigue and fever. Tdap 2013    Hep A & B vaccination complete      Pneumonia vac 2017 ; 2018    Flu vac 10/23     Covid vac 10/23  NEW     Shingrex   6/19   COMPLETE     RSV vac    HENT: Negative. Dental exam  Reg    Eyes: Negative. Eye Ex  12/22   Respiratory:  Negative for cough, chest tightness, shortness of breath and wheezing. Does not smoke . Etoh occ   Cardiovascular: Negative. No CAD ; Complete Cardiac W/U at [facility] 2/22     Fairly active ; Swims     Cardiac W/U        At [facility] 2/22  For Renal Transplant    Gastrointestinal: Negative.          No

## 2023-12-29 ENCOUNTER — TELEPHONE (OUTPATIENT)
Dept: PRIMARY CARE CLINIC | Age: 58
End: 2023-12-29

## 2023-12-29 RX ORDER — PROCHLORPERAZINE 25 MG/1
SUPPOSITORY RECTAL
Qty: 1 EACH | Refills: 0 | Status: SHIPPED | OUTPATIENT
Start: 2023-12-29

## 2023-12-29 NOTE — TELEPHONE ENCOUNTER
JEANNIE   Pt called to inform dr. Darcie Mcnally he receive a kidney transplant 12/19/23    Pt is wanting to know what meter will dr. Darcie Mcnally prescriber for pt to use for diabetes       [facility]/PHARMACY [address] [place] [address]. - P [phone] - F [phone] [[id number]]     Please call pt to advise

## 2024-01-30 RX ORDER — INSULIN LISPRO 100 [IU]/ML
INJECTION, SOLUTION INTRAVENOUS; SUBCUTANEOUS
COMMUNITY
Start: 2023-12-27

## 2024-01-30 RX ORDER — PEN NEEDLE, DIABETIC 32GX 5/32"
1 NEEDLE, DISPOSABLE MISCELLANEOUS DAILY
COMMUNITY
Start: 2024-01-24

## 2024-01-30 NOTE — TELEPHONE ENCOUNTER
PT called in stating that he is a kidney transplant recipient and they have him on insulin and PT is in need of a refill.     Pt says that he is on the Nanopen.     Please send to [facility].     LOV: 12/7/23  NOV: N/A    Best call back number: [phone]

## 2024-01-30 NOTE — TELEPHONE ENCOUNTER
PT said he was on Nanopen. That's what he told me when I asked what insulin. I put that in the message.

## 2024-01-31 RX ORDER — INSULIN LISPRO 200 [IU]/ML
5 INJECTION, SOLUTION SUBCUTANEOUS
Qty: 2 ADJUSTABLE DOSE PRE-FILLED PEN SYRINGE | Refills: 2 | Status: SHIPPED | OUTPATIENT
Start: 2024-01-31

## 2024-02-06 ENCOUNTER — TELEPHONE (OUTPATIENT)
Dept: PRIMARY CARE CLINIC | Age: 59
End: 2024-02-06

## 2024-02-06 RX ORDER — INSULIN LISPRO 200 [IU]/ML
5 INJECTION, SOLUTION SUBCUTANEOUS
Qty: 2 ADJUSTABLE DOSE PRE-FILLED PEN SYRINGE | Refills: 2 | Status: SHIPPED | OUTPATIENT
Start: 2024-02-06

## 2024-02-06 NOTE — TELEPHONE ENCOUNTER
Medication:   Requested Prescriptions     Pending Prescriptions Disp Refills    insulin lispro (HUMALOG KWIKPEN) 200 UNIT/ML SOPN pen 2 Adjustable Dose Pre-filled Pen Syringe 2     Sig: Inject 5 Units into the skin 3 times daily (with meals)        Last Filled:      Patient Phone Number: [phone] (home) [phone] (work)    Last appt: 12/7/2023   Next appt: Visit date not found    Last OARRS:        No data to display

## 2024-02-06 NOTE — TELEPHONE ENCOUNTER
Patient needs new prescription sent to [facility]  [address] · [phone] for   insulin lispro (HUMALOG KWIKPEN) 200 UNIT/ML SOPN pen

## 2024-03-03 ASSESSMENT — PATIENT HEALTH QUESTIONNAIRE - PHQ9
SUM OF ALL RESPONSES TO PHQ QUESTIONS 1-9: 0
SUM OF ALL RESPONSES TO PHQ9 QUESTIONS 1 & 2: 0
SUM OF ALL RESPONSES TO PHQ QUESTIONS 1-9: 0
SUM OF ALL RESPONSES TO PHQ QUESTIONS 1-9: 0
2. FEELING DOWN, DEPRESSED OR HOPELESS: NOT AT ALL
2. FEELING DOWN, DEPRESSED OR HOPELESS: 0
SUM OF ALL RESPONSES TO PHQ QUESTIONS 1-9: 0
1. LITTLE INTEREST OR PLEASURE IN DOING THINGS: 0
SUM OF ALL RESPONSES TO PHQ9 QUESTIONS 1 & 2: 0
1. LITTLE INTEREST OR PLEASURE IN DOING THINGS: NOT AT ALL

## 2024-03-06 ENCOUNTER — OFFICE VISIT (OUTPATIENT)
Dept: PRIMARY CARE CLINIC | Age: 59
End: 2024-03-06
Payer: COMMERCIAL

## 2024-03-06 VITALS
TEMPERATURE: 97.4 F | OXYGEN SATURATION: 94 % | HEIGHT: 70 IN | HEART RATE: 64 BPM | WEIGHT: 206.2 LBS | DIASTOLIC BLOOD PRESSURE: 60 MMHG | RESPIRATION RATE: 16 BRPM | BODY MASS INDEX: 29.52 KG/M2 | SYSTOLIC BLOOD PRESSURE: 100 MMHG

## 2024-03-06 DIAGNOSIS — R63.4 WEIGHT LOSS: ICD-10-CM

## 2024-03-06 DIAGNOSIS — I10 ESSENTIAL HYPERTENSION: ICD-10-CM

## 2024-03-06 DIAGNOSIS — E11.9 TYPE 2 DIABETES MELLITUS WITHOUT COMPLICATION, WITHOUT LONG-TERM CURRENT USE OF INSULIN (HCC): Primary | ICD-10-CM

## 2024-03-06 LAB — HBA1C MFR BLD: 9.2 %

## 2024-03-06 PROCEDURE — 3078F DIAST BP <80 MM HG: CPT | Performed by: INTERNAL MEDICINE

## 2024-03-06 PROCEDURE — 83036 HEMOGLOBIN GLYCOSYLATED A1C: CPT | Performed by: INTERNAL MEDICINE

## 2024-03-06 PROCEDURE — 3074F SYST BP LT 130 MM HG: CPT | Performed by: INTERNAL MEDICINE

## 2024-03-06 PROCEDURE — 3046F HEMOGLOBIN A1C LEVEL >9.0%: CPT | Performed by: INTERNAL MEDICINE

## 2024-03-06 PROCEDURE — 99214 OFFICE O/P EST MOD 30 MIN: CPT | Performed by: INTERNAL MEDICINE

## 2024-03-06 RX ORDER — INSULIN LISPRO 200 [IU]/ML
5 INJECTION, SOLUTION SUBCUTANEOUS
Qty: 2 ADJUSTABLE DOSE PRE-FILLED PEN SYRINGE | Refills: 2 | Status: SHIPPED | OUTPATIENT
Start: 2024-03-06

## 2024-03-06 RX ORDER — NIFEDIPINE 60 MG/1
60 TABLET, EXTENDED RELEASE ORAL 2 TIMES DAILY
Qty: 180 TABLET | Refills: 1 | Status: SHIPPED | OUTPATIENT
Start: 2024-03-06

## 2024-03-06 RX ORDER — PROCHLORPERAZINE 25 MG/1
SUPPOSITORY RECTAL
Qty: 1 EACH | Refills: 0 | Status: SHIPPED | OUTPATIENT
Start: 2024-03-06

## 2024-03-06 RX ORDER — ASPIRIN 81 MG/1
81 TABLET ORAL DAILY
Qty: 90 TABLET | Refills: 5 | Status: SHIPPED | OUTPATIENT
Start: 2024-03-06

## 2024-03-06 RX ORDER — ATORVASTATIN CALCIUM 20 MG/1
20 TABLET, FILM COATED ORAL DAILY
Qty: 90 TABLET | Refills: 1 | Status: SHIPPED | OUTPATIENT
Start: 2024-03-06

## 2024-03-06 RX ORDER — INSULIN GLARGINE 100 [IU]/ML
10 INJECTION, SOLUTION SUBCUTANEOUS NIGHTLY
Qty: 5 ADJUSTABLE DOSE PRE-FILLED PEN SYRINGE | Refills: 0 | Status: SHIPPED | OUTPATIENT
Start: 2024-03-06 | End: 2024-03-06 | Stop reason: SDUPTHER

## 2024-03-06 RX ORDER — METOPROLOL SUCCINATE 25 MG/1
25 TABLET, EXTENDED RELEASE ORAL DAILY
Qty: 90 TABLET | Refills: 1 | Status: SHIPPED | OUTPATIENT
Start: 2024-03-06

## 2024-03-06 RX ORDER — INSULIN GLARGINE 100 [IU]/ML
10 INJECTION, SOLUTION SUBCUTANEOUS NIGHTLY
Qty: 5 ADJUSTABLE DOSE PRE-FILLED PEN SYRINGE | Refills: 0 | Status: SHIPPED | OUTPATIENT
Start: 2024-03-06 | End: 2024-03-08 | Stop reason: SDUPTHER

## 2024-03-06 ASSESSMENT — ENCOUNTER SYMPTOMS
WHEEZING: 0
COUGH: 0
EYES NEGATIVE: 1
CHEST TIGHTNESS: 0
SHORTNESS OF BREATH: 0
GASTROINTESTINAL NEGATIVE: 1

## 2024-03-06 NOTE — PROGRESS NOTES
Subjective:      Patient ID: Justin Melo Jr. is a 58 y.o. male.    3/6/2024 Patient presents with:  Diabetes ; HTN     S/P    second kidney transplant 12/19/23 12/7/2023 Patient presents with:  Annual Exam                  12/20/2022 Patient presents with:  Pre-op Exam: Cystoscopy Transurethral Resection Of The Prostate, [facility], Surgery 1/13/2023, Dr Antwan Clark    S/P kidney transplant 2014  No  Personal or FH of any issues  with anaesthesia or bleeding disorders   Labs and EKG  12/16/22 at [facility] Complete Cardiac W/U 2/22 At [facility]           Last seen  4/11/2022 Patient presents with:  Pre-op Exam: Cystoscopy urolift, surgery 5/9/2022, Dr Antwan Clark, [facility]          1/27/2021 Patient presents with:  Diabetes: a1c- 1/11/2021-6.3 taken at the [facility]  Hypertension              Last seen  1/8/2020 Patient presents with:  Hernia    Was seen in ER 12/28/19 with rt sided flank pain . ? Hernia . Pain continues!          Last seen  10/1/19          Surgery    7/17/17 Dr Yoon Cystoscopy  H/O repeat UTIs         S/p kidney transplant 2014           Diabetes  Pertinent negatives for hypoglycemia include no dizziness, headaches, nervousness/anxiousness or tremors. Pertinent negatives for diabetes include no fatigue and no weakness.   Hypertension  Pertinent negatives include no headaches or shortness of breath.       Review of Systems   Constitutional:  Negative for chills, fatigue and fever.        Tdap 2013    Hep A & B vaccination complete      Pneumonia vac 2017 ; 2018    Flu vac 10/23     Covid vac 10/23  NEW     Shingrex   6/19   COMPLETE     RSV vac    HENT: Negative.          Dental exam  Reg    Eyes: Negative.         Eye Ex  12/22   Respiratory:  Negative for cough, chest tightness, shortness of breath and wheezing.         Does not smoke .   Etoh occ   Cardiovascular: Negative.         No CAD ; Complete Cardiac W/U at [facility] 2/22     Fairly active ; Swims

## 2024-03-08 ENCOUNTER — TELEPHONE (OUTPATIENT)
Dept: PRIMARY CARE CLINIC | Age: 59
End: 2024-03-08

## 2024-03-08 DIAGNOSIS — E11.9 TYPE 2 DIABETES MELLITUS WITHOUT COMPLICATION, WITHOUT LONG-TERM CURRENT USE OF INSULIN (HCC): ICD-10-CM

## 2024-03-08 RX ORDER — INSULIN GLARGINE 100 [IU]/ML
10 INJECTION, SOLUTION SUBCUTANEOUS NIGHTLY
Qty: 5 ADJUSTABLE DOSE PRE-FILLED PEN SYRINGE | Refills: 5 | Status: SHIPPED | OUTPATIENT
Start: 2024-03-08

## 2024-03-08 NOTE — TELEPHONE ENCOUNTER
Patient requesting refill for insulin glargine (LANTUS SOLOSTAR) 100 UNIT/ML injection pen.        [facility]/pharmacy #[id number] - [place], [place] - [address] - P [phone] - F [phone], [facility] [address]

## 2024-03-13 ENCOUNTER — TELEPHONE (OUTPATIENT)
Dept: PRIMARY CARE CLINIC | Age: 59
End: 2024-03-13

## 2024-06-06 ENCOUNTER — OFFICE VISIT (OUTPATIENT)
Dept: PRIMARY CARE CLINIC | Age: 59
End: 2024-06-06
Payer: COMMERCIAL

## 2024-06-06 VITALS
DIASTOLIC BLOOD PRESSURE: 60 MMHG | SYSTOLIC BLOOD PRESSURE: 110 MMHG | HEART RATE: 65 BPM | HEIGHT: 70 IN | RESPIRATION RATE: 16 BRPM | OXYGEN SATURATION: 99 % | TEMPERATURE: 97.1 F | WEIGHT: 204.6 LBS | BODY MASS INDEX: 29.29 KG/M2

## 2024-06-06 DIAGNOSIS — I10 ESSENTIAL HYPERTENSION: ICD-10-CM

## 2024-06-06 DIAGNOSIS — E11.9 TYPE 2 DIABETES MELLITUS WITHOUT COMPLICATION, WITHOUT LONG-TERM CURRENT USE OF INSULIN (HCC): Primary | ICD-10-CM

## 2024-06-06 LAB — HBA1C MFR BLD: 8.6 %

## 2024-06-06 PROCEDURE — 1036F TOBACCO NON-USER: CPT | Performed by: INTERNAL MEDICINE

## 2024-06-06 PROCEDURE — 2022F DILAT RTA XM EVC RTNOPTHY: CPT | Performed by: INTERNAL MEDICINE

## 2024-06-06 PROCEDURE — 83036 HEMOGLOBIN GLYCOSYLATED A1C: CPT | Performed by: INTERNAL MEDICINE

## 2024-06-06 PROCEDURE — 3052F HG A1C>EQUAL 8.0%<EQUAL 9.0%: CPT | Performed by: INTERNAL MEDICINE

## 2024-06-06 PROCEDURE — 99213 OFFICE O/P EST LOW 20 MIN: CPT | Performed by: INTERNAL MEDICINE

## 2024-06-06 PROCEDURE — G8427 DOCREV CUR MEDS BY ELIG CLIN: HCPCS | Performed by: INTERNAL MEDICINE

## 2024-06-06 PROCEDURE — 3078F DIAST BP <80 MM HG: CPT | Performed by: INTERNAL MEDICINE

## 2024-06-06 PROCEDURE — G8417 CALC BMI ABV UP PARAM F/U: HCPCS | Performed by: INTERNAL MEDICINE

## 2024-06-06 PROCEDURE — 3017F COLORECTAL CA SCREEN DOC REV: CPT | Performed by: INTERNAL MEDICINE

## 2024-06-06 PROCEDURE — 3074F SYST BP LT 130 MM HG: CPT | Performed by: INTERNAL MEDICINE

## 2024-06-06 RX ORDER — ATORVASTATIN CALCIUM 20 MG/1
20 TABLET, FILM COATED ORAL DAILY
Qty: 90 TABLET | Refills: 1 | Status: SHIPPED | OUTPATIENT
Start: 2024-06-06

## 2024-06-06 RX ORDER — NIFEDIPINE 30 MG/1
30 TABLET, EXTENDED RELEASE ORAL DAILY
Qty: 90 TABLET | Refills: 1 | Status: SHIPPED | OUTPATIENT
Start: 2024-06-06

## 2024-06-06 RX ORDER — INSULIN GLARGINE 100 [IU]/ML
15 INJECTION, SOLUTION SUBCUTANEOUS NIGHTLY
Qty: 5 ADJUSTABLE DOSE PRE-FILLED PEN SYRINGE | Refills: 5 | Status: SHIPPED | OUTPATIENT
Start: 2024-06-06

## 2024-06-06 RX ORDER — ASPIRIN 81 MG/1
81 TABLET ORAL DAILY
Qty: 90 TABLET | Refills: 5 | Status: SHIPPED | OUTPATIENT
Start: 2024-06-06

## 2024-06-06 RX ORDER — METOPROLOL SUCCINATE 25 MG/1
25 TABLET, EXTENDED RELEASE ORAL DAILY
Qty: 90 TABLET | Refills: 1 | Status: SHIPPED | OUTPATIENT
Start: 2024-06-06

## 2024-06-06 ASSESSMENT — ENCOUNTER SYMPTOMS
COUGH: 0
SHORTNESS OF BREATH: 0
WHEEZING: 0
EYES NEGATIVE: 1
CHEST TIGHTNESS: 0
GASTROINTESTINAL NEGATIVE: 1

## 2024-06-06 NOTE — PROGRESS NOTES
insulin glargine (LANTUS SOLOSTAR) 100 UNIT/ML injection pen; Inject 15 Units into the skin nightly    Essential hypertension  Controlled . Nifedipine reduced to 30 mg / d   -     aspirin 81 MG EC tablet; Take 1 tablet by mouth daily  -     metoprolol succinate (TOPROL XL) 25 MG extended release tablet; Take 1 tablet by mouth daily  -     NIFEdipine (PROCARDIA XL) 30 MG extended release tablet; Take 1 tablet by mouth daily          Weight loss  s/p Surgery; was on steroids   labs stable 2/24 / [facility]

## 2024-07-30 ENCOUNTER — PATIENT MESSAGE (OUTPATIENT)
Dept: PRIMARY CARE CLINIC | Age: 59
End: 2024-07-30

## 2024-07-30 DIAGNOSIS — Z12.11 SCREEN FOR COLON CANCER: Primary | ICD-10-CM

## 2024-07-31 NOTE — TELEPHONE ENCOUNTER
From: Justin Melo Jr.  To: Dr. Kimo Salazar  Sent: 7/30/2024 3:09 PM EDT  Subject: A referral for a colonoscopy    Referral

## 2024-12-03 DIAGNOSIS — I10 ESSENTIAL HYPERTENSION: ICD-10-CM

## 2024-12-03 RX ORDER — NIFEDIPINE 30 MG/1
30 TABLET, EXTENDED RELEASE ORAL DAILY
Qty: 90 TABLET | Refills: 3 | Status: SHIPPED | OUTPATIENT
Start: 2024-12-03

## 2024-12-03 NOTE — TELEPHONE ENCOUNTER
Medication:   Requested Prescriptions     Pending Prescriptions Disp Refills    NIFEdipine (PROCARDIA XL) 30 MG extended release tablet [Pharmacy Med Name: NIFEDIPINE ER (XL)TABS 30MG] 90 tablet 3     Sig: TAKE 1 TABLET DAILY        Last Filled:      Patient Phone Number: [phone] (home) [phone] (work)    Last appt: 6/6/2024   Next appt: Visit date not found    Last OARRS:        No data to display

## 2024-12-23 DIAGNOSIS — E11.9 TYPE 2 DIABETES MELLITUS WITHOUT COMPLICATION, WITHOUT LONG-TERM CURRENT USE OF INSULIN (HCC): ICD-10-CM

## 2024-12-23 NOTE — TELEPHONE ENCOUNTER
Medication:   Requested Prescriptions     Pending Prescriptions Disp Refills    atorvastatin (LIPITOR) 20 MG tablet [Pharmacy Med Name: ATORVASTATIN TABS 20MG] 90 tablet 3     Sig: TAKE 1 TABLET DAILY        Last Filled:      Patient Phone Number: [phone] (home) [phone] (work)    Last appt: 6/6/2024   Next appt: Visit date not found    Last OARRS:        No data to display

## 2024-12-24 RX ORDER — ATORVASTATIN CALCIUM 20 MG/1
20 TABLET, FILM COATED ORAL DAILY
Qty: 90 TABLET | Refills: 3 | Status: SHIPPED | OUTPATIENT
Start: 2024-12-24

## 2025-01-09 DIAGNOSIS — I10 ESSENTIAL HYPERTENSION: ICD-10-CM

## 2025-01-09 NOTE — TELEPHONE ENCOUNTER
Medication:   Requested Prescriptions     Pending Prescriptions Disp Refills    metoprolol succinate (TOPROL XL) 25 MG extended release tablet [Pharmacy Med Name: METOPROLOL SUCCINATE ER TABS 25MG] 90 tablet 3     Sig: TAKE 1 TABLET DAILY        Last Filled:      Patient Phone Number: [phone] (home) [phone] (work)    Last appt: 6/6/2024   Next appt: Visit date not found    Last OARRS:        No data to display

## 2025-01-10 RX ORDER — METOPROLOL SUCCINATE 25 MG/1
25 TABLET, EXTENDED RELEASE ORAL DAILY
Qty: 90 TABLET | Refills: 3 | Status: SHIPPED | OUTPATIENT
Start: 2025-01-10

## 2025-02-18 ENCOUNTER — TELEPHONE (OUTPATIENT)
Dept: VASCULAR SURGERY | Age: 60
End: 2025-02-18

## 2025-02-18 NOTE — TELEPHONE ENCOUNTER
Patient called in complaining about pain in arm where Dr Davila performed the fistula ligation. Dr Davila reccommended patient use warm compresses on his arm several times throughout the day and use his arm as much as he can. Patient advised and understandes

## 2025-03-07 DIAGNOSIS — I10 ESSENTIAL HYPERTENSION: ICD-10-CM

## 2025-03-07 RX ORDER — NIFEDIPINE 30 MG/1
30 TABLET, EXTENDED RELEASE ORAL DAILY
Qty: 90 TABLET | Refills: 3 | Status: SHIPPED | OUTPATIENT
Start: 2025-03-07

## 2025-03-07 NOTE — TELEPHONE ENCOUNTER
Medication:   Requested Prescriptions     Pending Prescriptions Disp Refills    NIFEdipine (PROCARDIA XL) 30 MG extended release tablet 90 tablet 3     Sig: Take 1 tablet by mouth daily        Last Filled:      Patient Phone Number: [phone] (home) [phone] (work)    Last appt: 6/6/2024   Next appt: Visit date not found    Last OARRS:        No data to display

## 2025-04-08 DIAGNOSIS — I10 ESSENTIAL HYPERTENSION: ICD-10-CM

## 2025-04-08 RX ORDER — NIFEDIPINE 60 MG/1
60 TABLET, EXTENDED RELEASE ORAL 2 TIMES DAILY
Qty: 180 TABLET | Refills: 3 | OUTPATIENT
Start: 2025-04-08

## 2025-04-08 NOTE — TELEPHONE ENCOUNTER
Needs appointment! Last 06/06/2024 Return in about 6 months (around 12/6/2024),  refill denied NIFEdipine 60mg was discontinued and reduced to 30 mg on 06/06/2024.  NIFEdipine        30 mg Oral DAILY

## 2025-04-24 ENCOUNTER — OFFICE VISIT (OUTPATIENT)
Dept: PRIMARY CARE CLINIC | Age: 60
End: 2025-04-24
Payer: COMMERCIAL

## 2025-04-24 VITALS
WEIGHT: 212.4 LBS | HEART RATE: 79 BPM | TEMPERATURE: 97.1 F | BODY MASS INDEX: 30.41 KG/M2 | OXYGEN SATURATION: 99 % | SYSTOLIC BLOOD PRESSURE: 120 MMHG | HEIGHT: 70 IN | DIASTOLIC BLOOD PRESSURE: 70 MMHG

## 2025-04-24 DIAGNOSIS — E11.9 TYPE 2 DIABETES MELLITUS WITHOUT COMPLICATION, WITHOUT LONG-TERM CURRENT USE OF INSULIN (HCC): ICD-10-CM

## 2025-04-24 DIAGNOSIS — I10 ESSENTIAL HYPERTENSION: ICD-10-CM

## 2025-04-24 DIAGNOSIS — Z23 NEED FOR PNEUMOCOCCAL VACCINATION: Primary | ICD-10-CM

## 2025-04-24 DIAGNOSIS — Z23 NEED FOR TDAP VACCINATION: ICD-10-CM

## 2025-04-24 DIAGNOSIS — Z23 NEED FOR COVID-19 VACCINE: ICD-10-CM

## 2025-04-24 PROCEDURE — 3044F HG A1C LEVEL LT 7.0%: CPT | Performed by: INTERNAL MEDICINE

## 2025-04-24 PROCEDURE — 3074F SYST BP LT 130 MM HG: CPT | Performed by: INTERNAL MEDICINE

## 2025-04-24 PROCEDURE — 99214 OFFICE O/P EST MOD 30 MIN: CPT | Performed by: INTERNAL MEDICINE

## 2025-04-24 PROCEDURE — 3078F DIAST BP <80 MM HG: CPT | Performed by: INTERNAL MEDICINE

## 2025-04-24 RX ORDER — DULAGLUTIDE 1.5 MG/.5ML
1.5 INJECTION, SOLUTION SUBCUTANEOUS
COMMUNITY
Start: 2024-12-12

## 2025-04-24 RX ORDER — TACROLIMUS 1 MG/1
CAPSULE ORAL
COMMUNITY
Start: 2024-09-13

## 2025-04-24 SDOH — ECONOMIC STABILITY: FOOD INSECURITY: WITHIN THE PAST 12 MONTHS, YOU WORRIED THAT YOUR FOOD WOULD RUN OUT BEFORE YOU GOT MONEY TO BUY MORE.: NEVER TRUE

## 2025-04-24 SDOH — ECONOMIC STABILITY: TRANSPORTATION INSECURITY
IN THE PAST 12 MONTHS, HAS LACK OF TRANSPORTATION KEPT YOU FROM MEETINGS, WORK, OR FROM GETTING THINGS NEEDED FOR DAILY LIVING?: NO

## 2025-04-24 SDOH — ECONOMIC STABILITY: FOOD INSECURITY: WITHIN THE PAST 12 MONTHS, THE FOOD YOU BOUGHT JUST DIDN'T LAST AND YOU DIDN'T HAVE MONEY TO GET MORE.: NEVER TRUE

## 2025-04-24 SDOH — ECONOMIC STABILITY: TRANSPORTATION INSECURITY
IN THE PAST 12 MONTHS, HAS THE LACK OF TRANSPORTATION KEPT YOU FROM MEDICAL APPOINTMENTS OR FROM GETTING MEDICATIONS?: NO

## 2025-04-24 SDOH — ECONOMIC STABILITY: INCOME INSECURITY: IN THE LAST 12 MONTHS, WAS THERE A TIME WHEN YOU WERE NOT ABLE TO PAY THE MORTGAGE OR RENT ON TIME?: NO

## 2025-04-24 ASSESSMENT — PATIENT HEALTH QUESTIONNAIRE - PHQ9
SUM OF ALL RESPONSES TO PHQ QUESTIONS 1-9: 0
SUM OF ALL RESPONSES TO PHQ QUESTIONS 1-9: 0
SUM OF ALL RESPONSES TO PHQ9 QUESTIONS 1 & 2: 0
1. LITTLE INTEREST OR PLEASURE IN DOING THINGS: NOT AT ALL
2. FEELING DOWN, DEPRESSED OR HOPELESS: NOT AT ALL
SUM OF ALL RESPONSES TO PHQ QUESTIONS 1-9: 0
2. FEELING DOWN, DEPRESSED OR HOPELESS: NOT AT ALL
1. LITTLE INTEREST OR PLEASURE IN DOING THINGS: NOT AT ALL
SUM OF ALL RESPONSES TO PHQ QUESTIONS 1-9: 0

## 2025-04-24 ASSESSMENT — ENCOUNTER SYMPTOMS
CHEST TIGHTNESS: 0
GASTROINTESTINAL NEGATIVE: 1
COUGH: 0
EYES NEGATIVE: 1
SHORTNESS OF BREATH: 0
WHEEZING: 0

## 2025-04-24 NOTE — PROGRESS NOTES
Subjective:      Patient ID: Justin Melo Jr. is a 59 y.o. male.    4/24/2025  Patient presents with:  Diabetes: Last A1C 3/11/25- 6.6                6/6/2024  Patient presents with:  Diabetes                3/6/2024 Patient presents with:  Diabetes ; HTN     S/P    second kidney transplant 12/19/23 12/7/2023 Patient presents with:  Annual Exam                  12/20/2022 Patient presents with:  Pre-op Exam: Cystoscopy Transurethral Resection Of The Prostate, [facility], Surgery 1/13/2023, Dr Antwan Clark    S/P kidney transplant 2014  No  Personal or FH of any issues  with anaesthesia or bleeding disorders   Labs and EKG  12/16/22 at [facility] Complete Cardiac W/U 2/22 At [facility]           Last seen  4/11/2022 Patient presents with:  Pre-op Exam: Cystoscopy urolift, surgery 5/9/2022, Dr Antwan Clark, [facility]          1/27/2021 Patient presents with:  Diabetes: a1c- 1/11/2021-6.3 taken at the [facility]  Hypertension              Last seen  1/8/2020 Patient presents with:  Hernia    Was seen in ER 12/28/19 with rt sided flank pain . ? Hernia . Pain continues!          Last seen  10/1/19          Surgery    7/17/17 Dr Yoon Cystoscopy  H/O repeat UTIs         S/p kidney transplant 2014           Diabetes  Pertinent negatives for hypoglycemia include no dizziness, headaches, nervousness/anxiousness or tremors. Pertinent negatives for diabetes include no fatigue and no weakness.   Hypertension  Pertinent negatives include no headaches or shortness of breath.       Review of Systems   Constitutional:  Negative for chills, fatigue and fever.        Tdap 2023 at transplant    Hep A & B vaccination complete      Pneumonia vac   2017 ; 2018    Flu vac 10/24     Covid vac 10/23      Shingrex   6/19   COMPLETE        HENT: Negative.          Dental exam  Reg    Eyes: Negative.         Eye Ex   2024   Respiratory:  Negative for cough, chest tightness, shortness of breath and wheezing.

## 2025-06-02 DIAGNOSIS — E11.9 TYPE 2 DIABETES MELLITUS WITHOUT COMPLICATION, WITHOUT LONG-TERM CURRENT USE OF INSULIN (HCC): ICD-10-CM

## 2025-06-02 NOTE — TELEPHONE ENCOUNTER
Medication:   Requested Prescriptions     Pending Prescriptions Disp Refills    LANTUS SOLOSTAR 100 UNIT/ML injection pen [Pharmacy Med Name: LANTUS SOLOSTAR PEN 3ML 5'S 100U/ML] 15 mL 3     Sig: INJECT 15 UNITS UNDER THE SKIN NIGHTLY       Last Filled:      Patient Phone Number: [phone] (home) [phone] (work)    Last appt: 4/24/2025   Next appt: Visit date not found    Last Labs DM:   Lab Results   Component Value Date/Time    LABA1C 6.6 03/11/2025 09:16 AM

## 2025-06-03 RX ORDER — INSULIN GLARGINE 100 [IU]/ML
INJECTION, SOLUTION SUBCUTANEOUS
Qty: 15 ML | Refills: 3 | Status: SHIPPED | OUTPATIENT
Start: 2025-06-03

## 2025-07-18 ENCOUNTER — TELEPHONE (OUTPATIENT)
Dept: PRIMARY CARE CLINIC | Age: 60
End: 2025-07-18

## 2025-07-18 RX ORDER — BLOOD PRESSURE TEST KIT
1 KIT MISCELLANEOUS DAILY
Qty: 1 KIT | Refills: 0 | Status: SHIPPED | OUTPATIENT
Start: 2025-07-18